# Patient Record
Sex: MALE | Race: WHITE | NOT HISPANIC OR LATINO | Employment: FULL TIME | URBAN - NONMETROPOLITAN AREA
[De-identification: names, ages, dates, MRNs, and addresses within clinical notes are randomized per-mention and may not be internally consistent; named-entity substitution may affect disease eponyms.]

---

## 2017-01-12 DIAGNOSIS — R05.9 COUGH: ICD-10-CM

## 2017-01-12 RX ORDER — ALBUTEROL SULFATE 90 UG/1
2 AEROSOL, METERED RESPIRATORY (INHALATION) EVERY 6 HOURS PRN
Qty: 8.5 G | Refills: 0 | Status: SHIPPED | OUTPATIENT
Start: 2017-01-12 | End: 2019-06-21

## 2017-02-14 RX ORDER — LEVOTHYROXINE SODIUM 88 UG/1
88 TABLET ORAL EVERY MORNING
Qty: 90 TAB | Refills: 3 | Status: SHIPPED | OUTPATIENT
Start: 2017-02-14 | End: 2017-11-10 | Stop reason: SDUPTHER

## 2017-06-02 ENCOUNTER — TELEPHONE (OUTPATIENT)
Dept: MEDICAL GROUP | Facility: PHYSICIAN GROUP | Age: 57
End: 2017-06-02

## 2017-06-02 NOTE — TELEPHONE ENCOUNTER
1. Caller Name: Devon                                          Call Back Number: [phone] (home)       Patient approves a detailed voicemail message: no    2. SPECIFIC Action To Be Taken: Labs    3. Diagnosis/Clinical Reason for Request: Annual    4. Specialty & Provider Name/Lab/Imaging Location: [facility]    5. Is appointment scheduled for requested order/referral: yes - Appt with Joanna 8/2017    Patient informed they will receive a return phone call from the office ONLY if there are any questions before processing their request. Advised to call back if they haven't received a call from the referral department in 5 days.

## 2017-08-04 ENCOUNTER — OFFICE VISIT (OUTPATIENT)
Dept: MEDICAL GROUP | Facility: PHYSICIAN GROUP | Age: 57
End: 2017-08-04
Payer: OTHER GOVERNMENT

## 2017-08-04 VITALS
TEMPERATURE: 97.6 F | SYSTOLIC BLOOD PRESSURE: 118 MMHG | BODY MASS INDEX: 29.26 KG/M2 | HEART RATE: 72 BPM | RESPIRATION RATE: 16 BRPM | WEIGHT: 228 LBS | HEIGHT: 74 IN | OXYGEN SATURATION: 98 % | DIASTOLIC BLOOD PRESSURE: 76 MMHG

## 2017-08-04 DIAGNOSIS — H93.13 TINNITUS OF BOTH EARS: ICD-10-CM

## 2017-08-04 DIAGNOSIS — E55.9 VITAMIN D DEFICIENCY DISEASE: ICD-10-CM

## 2017-08-04 DIAGNOSIS — E03.9 HYPOTHYROIDISM, UNSPECIFIED TYPE: ICD-10-CM

## 2017-08-04 DIAGNOSIS — G89.29 CHRONIC MIDLINE LOW BACK PAIN, WITH SCIATICA PRESENCE UNSPECIFIED: ICD-10-CM

## 2017-08-04 DIAGNOSIS — E78.5 DYSLIPIDEMIA: ICD-10-CM

## 2017-08-04 DIAGNOSIS — M54.5 CHRONIC MIDLINE LOW BACK PAIN, WITH SCIATICA PRESENCE UNSPECIFIED: ICD-10-CM

## 2017-08-04 PROCEDURE — 99214 OFFICE O/P EST MOD 30 MIN: CPT | Performed by: NURSE PRACTITIONER

## 2017-08-04 RX ORDER — CYCLOBENZAPRINE HCL 10 MG
5-10 TABLET ORAL 3 TIMES DAILY PRN
Qty: 30 TAB | Refills: 1 | Status: SHIPPED | OUTPATIENT
Start: 2017-08-04 | End: 2018-10-01 | Stop reason: SDUPTHER

## 2017-08-04 RX ORDER — BIOTIN 5 MG
TABLET ORAL
COMMUNITY
End: 2022-02-02

## 2017-08-04 ASSESSMENT — PATIENT HEALTH QUESTIONNAIRE - PHQ9
SUM OF ALL RESPONSES TO PHQ QUESTIONS 1-9: 3
CLINICAL INTERPRETATION OF PHQ2 SCORE: 1
5. POOR APPETITE OR OVEREATING: 0 - NOT AT ALL

## 2017-08-04 NOTE — PROGRESS NOTES
"Chief Complaint   Patient presents with   • Annual Exam     pt states, \"feeling run down\"         This is a 56 y.o.male patient that presents today with the following: Follow-up visit, establish care with new PCP discuss acute and chronic condition    Hypothyroidism  This is a chronic condition, status of control unknown. He is due for labs, these were ordered. Takes levothyroxine 88 mcg daily. He does feel tired and run down lately.  I will notify patient of results and further actions as needed.    Dyslipidemia  This is a chronic condition, status of control unknown. He is due for labs, these have been ordered. In the interim he is to continue with healthy eating, regular physical activity and continued efforts towards weight loss.    Vitamin D deficiency disease  Patient does have history of vitamin D deficiency, he takes over-the-counter vitamin D supplements. He has not had a vitamin D level checked, labs and ordered. I will notify him of results and further actions if needed.    Chronic back pain  Patient does have long-standing history of chronic back pain for which he mostly takes over-the-counter Tylenol or ibuprofen. He will occasionally take Flexeril at the end of the day but tries to avoid taking this too frequently because of side effects. However he is running low on his prescription and is requesting refill. This was called in for him. I did discuss with him the risks, benefits and side effects of medication.    Tinnitus of both ears  Patient does complain of chronic tinnitus of both ears, left greater than right. 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 He is wanting to know if this is anything else that can be done for it other than using white noise for distraction. He tells me his wife knows of someone it takes a medication to treat it. I did discuss with him that is likely an antidepressant because the tinnitus is extremely bothersome. Patient states " that that is likely what it is and he is not interested in an antidepressant, the tinnitus is not significantly bothersome to him at this point. He will let me know however if it does become bothersome to him.      No visits with results within 1 Month(s) from this visit.  Latest known visit with results is:    Office Visit on 03/07/2016   Component Date Value   • POC Color 03/07/2016 yellow    • POC Appearance 03/07/2016 clear    • POC Leukocyte Esterase 03/07/2016 neg    • POC Nitrites 03/07/2016 neg    • POC Urobiligen 03/07/2016 neg    • POC Protein 03/07/2016 neg    • POC Urine PH 03/07/2016 5.0    • POC Blood 03/07/2016 neg    • POC Specific Gravity 03/07/2016 1.005    • POC Ketones 03/07/2016 neg    • POC Biliruben 03/07/2016 neg    • POC Glucose 03/07/2016 neg          clinical course has been stable    Past Medical History   Diagnosis Date   • Hypothyroidism 8/25/2010   • Backpain      2000 started   • ED (erectile dysfunction) 8/25/2010   • Dyslipidemia 11/8/2011   • Vitamin d deficiency 5/2/2012       Past Surgical History   Procedure Laterality Date   • Laminotomy     • Other orthopedic surgery       back surgery 2002 L4   • Lumbar laminectomy diskectomy  3/18/2011     Performed by RAJ VALADEZ at SURGERY [facility]   • Lumbar decompression  3/18/2011     Performed by RAJ VALADEZ at SURGERY [facility]       Family History   Problem Relation Age of Onset   • Psychiatry Mother    • Diabetes Father        Review of patient's allergies indicates no known allergies.    Current Outpatient Prescriptions Ordered in [facility]   Medication Sig Dispense Refill   • Krill Oil 1000 MG Cap Take  by mouth.     • Probiotic Product (PROBIOTIC DAILY PO) Take  by mouth.     • cyclobenzaprine (FLEXERIL) 10 MG Tab Take 0.5-1 Tabs by mouth 3 times a day as needed for Moderate Pain. 30 Tab 1   • levothyroxine (SYNTHROID) 88 MCG Tab Take 1 Tab by mouth every morning. 90 Tab 3   • albuterol 108 (90 BASE) MCG/ACT Aero  "Soln inhalation aerosol Inhale 2 Puffs by mouth every 6 hours as needed for Shortness of Breath. 8.5 g 0   • naproxen (NAPROSYN) 500 MG Tab Take 1 Tab by mouth 2 times a day, with meals. 180 Tab 1   • Cholecalciferol (VITAMIN D PO) Take 2,000 Units by mouth every day.     • therapeutic multivitamin-minerals (THERAGRAN-M) TABS Take 1 Tab by mouth every day.         No current Epic-ordered facility-administered medications on file.       Constitutional ROS: No unexpected change in weight, No weakness, No unexplained fevers, sweats, or chills  Pulmonary ROS: No chronic cough, sputum, or hemoptysis, No shortness of breath, No recent change in breathing  Cardiovascular ROS: No chest pain, No edema, No palpitations  Gastrointestinal ROS: No abdominal pain, No nausea, vomiting, diarrhea, or constipation, no blood in stool  Musculoskeletal/Extremities ROS: Positive for chronic back pain  Neurologic ROS: Normal development, No seizures, No weakness  Endocrine ROS: Positive per history of present illness    Physical exam:  /76 mmHg  Pulse 72  Temp(Src) 36.4 °C (97.6 °F)  Resp 16  Ht 1.867 m (6' 1.5\")  Wt 103.42 kg (228 lb)  BMI 29.67 kg/m2  SpO2 98%  General Appearance: Middle-aged male, alert, no distress, moderately overweight, well-groomed  Skin: Skin color, texture, turgor normal. No rashes or lesions.  Lungs: negative findings: normal respiratory rate and rhythm, lungs clear to auscultation  Heart: negative. RRR without murmur, gallop, or rubs.  No ectopy.  Abdomen: Abdomen soft, non-tender. BS normal. No masses,  No organomegaly  Musculoskeletal: negative findings: ROM of all joints is normal, no evidence of joint instability, strength normal, no deformities present  Neurologic: intact, oriented, mood appropriate, judgment intact. Cranial nerves II-12 grossly intact    Medical decision making/discussion: Patient here to establish care with a PCP and discuss his acute and chronic conditions. He is due for " labs, these have been ordered. I will notify him of results and further actions if needed. He is to continue on all of his medications as prescribed and call for refills as needed. Flexeril has been refilled his take this as prescribed. He is to continue with healthy eating, regular physical activity and continued efforts towards weight loss. Otherwise he is to follow up annually and as needed    Devon was seen today for annual exam.    Diagnoses and all orders for this visit:    Chronic midline low back pain, with sciatica presence unspecified  -     cyclobenzaprine (FLEXERIL) 10 MG Tab; Take 0.5-1 Tabs by mouth 3 times a day as needed for Moderate Pain.    Hypothyroidism, unspecified type  -     TSH WITH REFLEX TO FT4; Future    Dyslipidemia  -     COMP METABOLIC PANEL; Future  -     LIPID PROFILE; Future    Vitamin D deficiency disease  -     VITAMIN D,25 HYDROXY; Future    Tinnitus of both ears          Please note that this dictation was created using voice recognition software. I have made every reasonable attempt to correct obvious errors, but I expect that there are errors of grammar and possibly content that I did not discover before finalizing the note.

## 2017-08-04 NOTE — ASSESSMENT & PLAN NOTE
Patient does have history of vitamin D deficiency, he takes over-the-counter vitamin D supplements. He has not had a vitamin D level checked, labs and ordered. I will notify him of results and further actions if needed.

## 2017-08-04 NOTE — ASSESSMENT & PLAN NOTE
Patient does complain of chronic tinnitus of both ears, left greater than right. 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 He is wanting to know if this is anything else that can be done for it other than using white noise for distraction. He tells me his wife knows of someone it takes a medication to treat it. I did discuss with him that is likely an antidepressant because the tinnitus is extremely bothersome. Patient states that that is likely what it is and he is not interested in an antidepressant, the tinnitus is not significantly bothersome to him at this point. He will let me know however if it does become bothersome to him.

## 2017-08-04 NOTE — ASSESSMENT & PLAN NOTE
This is a chronic condition, status of control unknown. He is due for labs, these were ordered. Takes levothyroxine 88 mcg daily. He does feel tired and run down lately.  I will notify patient of results and further actions as needed.

## 2017-08-04 NOTE — ASSESSMENT & PLAN NOTE
Patient does have long-standing history of chronic back pain for which he mostly takes over-the-counter Tylenol or ibuprofen. He will occasionally take Flexeril at the end of the day but tries to avoid taking this too frequently because of side effects. However he is running low on his prescription and is requesting refill. This was called in for him. I did discuss with him the risks, benefits and side effects of medication.

## 2017-08-04 NOTE — PATIENT INSTRUCTIONS
Have your labs done in the next week or so, will notify you of results    Take meds as prescribed--    Flu shot in the fall    Follow up with me annually and as needed

## 2017-08-04 NOTE — MR AVS SNAPSHOT
"        Devon Aman   2017 9:20 AM   Office Visit   MRN: [id number]    Department:  [facility]   Dept Phone:  [phone]    Description:  Male : 1960   Provider:  NIA Castro           Reason for Visit     Annual Exam pt states, \"feeling run down\"      Allergies as of 2017     No Known Allergies      You were diagnosed with     Chronic midline low back pain, with sciatica presence unspecified   [6333607]       Hypothyroidism, unspecified type   [6211446]       Dyslipidemia   [207835]       Vitamin D deficiency disease   [274714]       Tinnitus of both ears   [782477]         Vital Signs     Blood Pressure Pulse Temperature Respirations Height Weight    118/76 mmHg 72 36.4 °C (97.6 °F) 16 1.867 m (6' 1.5\") 103.42 kg (228 lb)    Body Mass Index Oxygen Saturation                29.67 kg/m2 98%          Basic Information     Date Of Birth Sex Race Ethnicity Preferred Language    1960 Male White Non- English      Problem List              ICD-10-CM Priority Class Noted - Resolved    Hypothyroidism E03.9   2010 - Present    ED (erectile dysfunction) N52.9   2010 - Present    Dyslipidemia E78.5   2011 - Present    Right shoulder pain M25.511   2014 - Present    Vitamin D deficiency disease E55.9   2014 - Present    Chronic back pain M54.9, G89.29   2015 - Present    Memory changes R41.3   2015 - Present    Cervical radiculopathy M54.12   3/18/2016 - Present    Lumbar stenosis M48.06   3/18/2016 - Present    Tinnitus of both ears H93.13   2017 - Present      Health Maintenance        Date Due Completion Dates    IMM INFLUENZA (1) 2017 ---    IMM DTaP/Tdap/Td Vaccine (2 - Td) 2022    COLONOSCOPY 2023            Current Immunizations     Tdap Vaccine 2012  3:49 PM      Below and/or attached are the medications your provider expects you to take. Review all of your home medications and newly " ordered medications with your provider and/or pharmacist. Follow medication instructions as directed by your provider and/or pharmacist. Please keep your medication list with you and share with your provider. Update the information when medications are discontinued, doses are changed, or new medications (including over-the-counter products) are added; and carry medication information at all times in the event of emergency situations     Allergies:  No Known Allergies          Medications  Valid as of: August 04, 2017 -  9:43 AM    Generic Name Brand Name Tablet Size Instructions for use    Albuterol Sulfate (Aero Soln) albuterol 108 (90 BASE) MCG/ACT Inhale 2 Puffs by mouth every 6 hours as needed for Shortness of Breath.        Cholecalciferol   Take 2,000 Units by mouth every day.        Cyclobenzaprine HCl (Tab) FLEXERIL 10 MG Take 0.5-1 Tabs by mouth 3 times a day as needed for Moderate Pain.        Krill Oil (Cap) Krill Oil 1000 MG Take  by mouth.        Levothyroxine Sodium (Tab) SYNTHROID 88 MCG Take 1 Tab by mouth every morning.        Multiple Vitamins-Minerals (Tab) THERAGRAN-M  Take 1 Tab by mouth every day.          Naproxen (Tab) NAPROSYN 500 MG Take 1 Tab by mouth 2 times a day, with meals.        Probiotic Product   Take  by mouth.        .                 Medicines prescribed today were sent to:     [id number] [place] [facility] [address]    [address] [address]    Phone: [phone] Fax: [phone]    Open 24 Hours?: No    [id number] [place] [facility] [address] AT [facility] [address]    [address] [address]    Phone: [phone] Fax: [phone]    Open 24 Hours?: No    [facility] - [place], [place] - [address] [facility]    [address] [facility] [address]    Phone: [phone] Fax: [phone]    Open 24 Hours?: No      Medication refill instructions:       If your  prescription bottle indicates you have medication refills left, it is not necessary to call your provider’s office. Please contact your pharmacy and they will refill your medication.    If your prescription bottle indicates you do not have any refills left, you may request refills at any time through one of the following ways: The online [facility] system (except Urgent Care), by calling your provider’s office, or by asking your pharmacy to contact your provider’s office with a refill request. Medication refills are processed only during regular business hours and may not be available until the next business day. Your provider may request additional information or to have a follow-up visit with you prior to refilling your medication.   *Please Note: Medication refills are assigned a new Rx number when refilled electronically. Your pharmacy may indicate that no refills were authorized even though a new prescription for the same medication is available at the pharmacy. Please request the medicine by name with the pharmacy before contacting your provider for a refill.        Your To Do List     Future Labs/Procedures Complete By Expires    COMP METABOLIC PANEL  As directed 8/4/2018    LIPID PROFILE  As directed 8/4/2018    TSH WITH REFLEX TO FT4  As directed 8/4/2018    VITAMIN D,25 HYDROXY  As directed 8/4/2018      Instructions    Have your labs done in the next week or so, will notify you of results    Take meds as prescribed--    Flu shot in the fall    Follow up with me annually and as needed          [facility] Access Code: Activation code not generated  Current [facility] Status: Active

## 2017-08-04 NOTE — ASSESSMENT & PLAN NOTE
This is a chronic condition, status of control unknown. He is due for labs, these have been ordered. In the interim he is to continue with healthy eating, regular physical activity and continued efforts towards weight loss.

## 2017-09-01 ENCOUNTER — OFFICE VISIT (OUTPATIENT)
Dept: URGENT CARE | Facility: PHYSICIAN GROUP | Age: 57
End: 2017-09-01
Payer: OTHER GOVERNMENT

## 2017-09-01 VITALS
BODY MASS INDEX: 32.6 KG/M2 | RESPIRATION RATE: 12 BRPM | HEART RATE: 62 BPM | WEIGHT: 246 LBS | DIASTOLIC BLOOD PRESSURE: 62 MMHG | SYSTOLIC BLOOD PRESSURE: 110 MMHG | TEMPERATURE: 97.5 F | OXYGEN SATURATION: 96 % | HEIGHT: 73 IN

## 2017-09-01 DIAGNOSIS — L02.32 BOIL OF BUTTOCK: ICD-10-CM

## 2017-09-01 DIAGNOSIS — E66.9 OBESITY (BMI 30-39.9): ICD-10-CM

## 2017-09-01 PROCEDURE — 99214 OFFICE O/P EST MOD 30 MIN: CPT | Performed by: FAMILY MEDICINE

## 2017-09-01 RX ORDER — SULFAMETHOXAZOLE AND TRIMETHOPRIM 800; 160 MG/1; MG/1
TABLET ORAL
Qty: 20 TAB | Refills: 0 | Status: SHIPPED | OUTPATIENT
Start: 2017-09-01 | End: 2019-06-21

## 2017-09-01 NOTE — PROGRESS NOTES
Chief Complaint:    Chief Complaint   Patient presents with   • Cyst     Lt Side buttock, inflammation       History of Present Illness:    This is a new problem. For few days, has left-sided buttock redness, swelling, and pain. Has had 3 prior similar episodes, twice needing I&D, once not needing I&D.      Review of Systems:    Constitutional: Negative for fever, chills, and diaphoresis.   Eyes: Negative for change in vision, photophobia, pain, redness, and discharge.  ENT: Negative for ear pain, ear discharge, hearing loss, tinnitus, nasal congestion, nosebleeds, and sore throat.    Respiratory: Negative for cough, hemoptysis, sputum production, shortness of breath, wheezing, and stridor.    Cardiovascular: Negative for chest pain, palpitations, orthopnea, claudication, leg swelling, and PND.   Gastrointestinal: Negative for abdominal pain, nausea, vomiting, diarrhea, constipation, blood in stool, and melena.   Genitourinary: Negative for dysuria, urinary urgency, urinary frequency, hematuria, and flank pain.   Musculoskeletal: No new symptoms.   Skin: See HPI.   Neurological: Negative for dizziness, tingling, tremors, sensory change, speech change, focal weakness, seizures, loss of consciousness, and headaches.   Endo: Hypothyroid, on medication.  Heme: Does not bruise/bleed easily.   Psychiatric/Behavioral: Negative for depression, suicidal ideas, hallucinations, memory loss and substance abuse. The patient is not nervous/anxious and does not have insomnia.      Past Medical History:    Past Medical History:   Diagnosis Date   • Vitamin d deficiency 5/2/2012   • Dyslipidemia 11/8/2011   • Hypothyroidism 8/25/2010   • ED (erectile dysfunction) 8/25/2010   • Backpain     2000 started       Past Surgical History:    Past Surgical History:   Procedure Laterality Date   • LUMBAR LAMINECTOMY DISKECTOMY  3/18/2011    Performed by RAJ VALADEZ at SURGERY [facility]   • LUMBAR DECOMPRESSION  3/18/2011    Performed  "by RAJ VALADEZ at SURGERY [facility]   • LAMINOTOMY     • OTHER ORTHOPEDIC SURGERY      back surgery 2002 L4       Social History:    Social History     Social History   • Marital status:      Spouse name: N/A   • Number of children: N/A   • Years of education: N/A     Occupational History   • Not on file.     Social History Main Topics   • Smoking status: Light Tobacco Smoker     Types: Cigars   • Smokeless tobacco: Former User     Types: Snuff, Chew     Quit date: 1/1/2017   • Alcohol use 2.5 oz/week     5 Cans of beer per week   • Drug use: No   • Sexual activity: Yes     Partners: Female     Other Topics Concern   • Not on file     Social History Narrative   • No narrative on file       Family History:    Family History   Problem Relation Age of Onset   • Psychiatry Mother    • Diabetes Father        Medications:    Current Outpatient Prescriptions on File Prior to Visit   Medication Sig Dispense Refill   • Krill Oil 1000 MG Cap Take  by mouth.     • Probiotic Product (PROBIOTIC DAILY PO) Take  by mouth.     • levothyroxine (SYNTHROID) 88 MCG Tab Take 1 Tab by mouth every morning. 90 Tab 3   • Cholecalciferol (VITAMIN D PO) Take 2,000 Units by mouth every day.     • therapeutic multivitamin-minerals (THERAGRAN-M) TABS Take 1 Tab by mouth every day.       • cyclobenzaprine (FLEXERIL) 10 MG Tab Take 0.5-1 Tabs by mouth 3 times a day as needed for Moderate Pain. 30 Tab 1   • albuterol 108 (90 BASE) MCG/ACT Aero Soln inhalation aerosol Inhale 2 Puffs by mouth every 6 hours as needed for Shortness of Breath. 8.5 g 0   • naproxen (NAPROSYN) 500 MG Tab Take 1 Tab by mouth 2 times a day, with meals. 180 Tab 1     No current facility-administered medications on file prior to visit.        Allergies:    No Known Allergies      Vitals:    Vitals:    09/01/17 1222   BP: 110/62   Pulse: 62   Resp: 12   Temp: 36.4 °C (97.5 °F)   SpO2: 96%   Weight: 111.6 kg (246 lb)   Height: 1.854 m (6' 1\")       Physical " Exam:    Constitutional: Vital signs reviewed. Appears well-developed and well-nourished. No acute distress.   Eyes: Sclera white, conjunctivae clear.   ENT: External ears normal. Hearing normal.   Neck: Neck supple.   Pulmonary/Chest: Respirations non-labored.   Musculoskeletal: Normal gait. Normal range of motion. No muscular atrophy or weakness.  Neurological: Alert and oriented to person, place, and time. Muscle tone normal. Coordination normal.   Skin: Left mid buttock, near cleft region: skin is erythematous, indurated, and tender to palpation. I do not see any pus or feel any definite fluctuance.  Psychiatric: Normal mood and affect. Behavior is normal. Judgment and thought content normal.       Assessment / Plan:    1. Boil of buttock  - sulfamethoxazole-trimethoprim (BACTRIM DS) 800-160 MG tablet; 1 TAB TWICE A DAY X 10 DAYS.  Dispense: 20 Tab; Refill: 0    2. Obesity (BMI 30-39.9)  - Patient identified as having weight management issue.  Appropriate orders and counseling given.      Discussed with him DDX and management options.    Discussed with him equivocal for I&D being helpful today but I will attempt if he would like.    Offered him I&D + antibiotic treatment vs. antibiotic treatment only today and return for I&D if getting worse or no improvement at all in 2-3 days.    He prefers to try antibiotic first and return if getting worse or not improving at all in 2-3 days for likely I&D if this is the case.    Agreeable to medication prescribed.    Follow-up with PCP or urgent care if getting worse or not better with above.

## 2017-10-17 RX ORDER — LEVOTHYROXINE SODIUM 88 UG/1
88 TABLET ORAL EVERY MORNING
Qty: 90 TAB | Refills: 0 | OUTPATIENT
Start: 2017-10-17

## 2017-11-08 ENCOUNTER — HOSPITAL ENCOUNTER (OUTPATIENT)
Dept: LAB | Facility: MEDICAL CENTER | Age: 57
End: 2017-11-08
Attending: NURSE PRACTITIONER
Payer: OTHER GOVERNMENT

## 2017-11-08 DIAGNOSIS — E78.5 DYSLIPIDEMIA: ICD-10-CM

## 2017-11-08 DIAGNOSIS — E55.9 VITAMIN D DEFICIENCY DISEASE: ICD-10-CM

## 2017-11-08 DIAGNOSIS — E03.9 HYPOTHYROIDISM, UNSPECIFIED TYPE: ICD-10-CM

## 2017-11-08 LAB
25(OH)D3 SERPL-MCNC: 25 NG/ML (ref 30–100)
ALBUMIN SERPL BCP-MCNC: 4.1 G/DL (ref 3.2–4.9)
ALBUMIN/GLOB SERPL: 1.7 G/DL
ALP SERPL-CCNC: 58 U/L (ref 30–99)
ALT SERPL-CCNC: 21 U/L (ref 2–50)
ANION GAP SERPL CALC-SCNC: 8 MMOL/L (ref 0–11.9)
AST SERPL-CCNC: 21 U/L (ref 12–45)
BILIRUB SERPL-MCNC: 0.7 MG/DL (ref 0.1–1.5)
BUN SERPL-MCNC: 15 MG/DL (ref 8–22)
CALCIUM SERPL-MCNC: 9.4 MG/DL (ref 8.5–10.5)
CHLORIDE SERPL-SCNC: 105 MMOL/L (ref 96–112)
CHOLEST SERPL-MCNC: 223 MG/DL (ref 100–199)
CO2 SERPL-SCNC: 25 MMOL/L (ref 20–33)
CREAT SERPL-MCNC: 1.01 MG/DL (ref 0.5–1.4)
GFR SERPL CREATININE-BSD FRML MDRD: >60 ML/MIN/1.73 M 2
GLOBULIN SER CALC-MCNC: 2.4 G/DL (ref 1.9–3.5)
GLUCOSE SERPL-MCNC: 84 MG/DL (ref 65–99)
HDLC SERPL-MCNC: 52 MG/DL
LDLC SERPL CALC-MCNC: 138 MG/DL
POTASSIUM SERPL-SCNC: 4.3 MMOL/L (ref 3.6–5.5)
PROT SERPL-MCNC: 6.5 G/DL (ref 6–8.2)
SODIUM SERPL-SCNC: 138 MMOL/L (ref 135–145)
T4 FREE SERPL-MCNC: 0.86 NG/DL (ref 0.53–1.43)
TRIGL SERPL-MCNC: 166 MG/DL (ref 0–149)
TSH SERPL DL<=0.005 MIU/L-ACNC: 5.71 UIU/ML (ref 0.3–3.7)

## 2017-11-08 PROCEDURE — 80053 COMPREHEN METABOLIC PANEL: CPT

## 2017-11-08 PROCEDURE — 36415 COLL VENOUS BLD VENIPUNCTURE: CPT

## 2017-11-08 PROCEDURE — 82306 VITAMIN D 25 HYDROXY: CPT

## 2017-11-08 PROCEDURE — 84443 ASSAY THYROID STIM HORMONE: CPT

## 2017-11-08 PROCEDURE — 84439 ASSAY OF FREE THYROXINE: CPT

## 2017-11-08 PROCEDURE — 80061 LIPID PANEL: CPT

## 2017-11-10 DIAGNOSIS — E03.9 HYPOTHYROIDISM, UNSPECIFIED TYPE: ICD-10-CM

## 2017-11-10 NOTE — TELEPHONE ENCOUNTER
*CHANGE IN PHARMACY*  Was the patient seen in the last year in this department? Yes     Does patient have an active prescription for medications requested? Yes     Received Request Via: Pharmacy      Pt met protocol?: NO    LAST OV 08/04/2017    No results found for: TSH      Lab Results  Component Value Date/Time   TSHULTRASEN 5.710 (H) 11/08/2017 0714

## 2017-11-13 RX ORDER — LEVOTHYROXINE SODIUM 0.1 MG/1
100 TABLET ORAL EVERY MORNING
Qty: 90 TAB | Refills: 1 | Status: SHIPPED | OUTPATIENT
Start: 2017-11-13 | End: 2018-07-02 | Stop reason: SDUPTHER

## 2017-11-13 NOTE — TELEPHONE ENCOUNTER
I have filled this but I increased the dose to 100 mcg as his TSH is mildly elevated. I would like him to have a TSH done in about 2 months, this has been ordered.

## 2018-07-02 NOTE — TELEPHONE ENCOUNTER
----- Message from Devon Newton sent at 6/30/2018  8:27 AM PDT -----  Regarding: Prescription Question  Contact: [phone]  Morning,   I need a refill for Synthroid 100 mcg for 90 days.  Can you please send it to [facility] in [place]?  You can call me at [phone] for any information.    Thanks  Devon

## 2018-07-03 RX ORDER — LEVOTHYROXINE SODIUM 0.1 MG/1
100 TABLET ORAL EVERY MORNING
Qty: 90 TAB | Refills: 0 | Status: SHIPPED | OUTPATIENT
Start: 2018-07-03 | End: 2018-10-01 | Stop reason: SDUPTHER

## 2018-07-03 NOTE — TELEPHONE ENCOUNTER
Patient notified. Appointment scheduled and would like PCP to order labs before his next appointment which is 10/1/2018.

## 2018-10-01 ENCOUNTER — OFFICE VISIT (OUTPATIENT)
Dept: MEDICAL GROUP | Facility: PHYSICIAN GROUP | Age: 58
End: 2018-10-01
Payer: OTHER GOVERNMENT

## 2018-10-01 VITALS
BODY MASS INDEX: 33.93 KG/M2 | OXYGEN SATURATION: 94 % | SYSTOLIC BLOOD PRESSURE: 118 MMHG | WEIGHT: 256 LBS | HEART RATE: 84 BPM | DIASTOLIC BLOOD PRESSURE: 72 MMHG | HEIGHT: 73 IN | RESPIRATION RATE: 16 BRPM | TEMPERATURE: 98.3 F

## 2018-10-01 DIAGNOSIS — E78.5 DYSLIPIDEMIA: ICD-10-CM

## 2018-10-01 DIAGNOSIS — Z23 NEED FOR INFLUENZA VACCINATION: ICD-10-CM

## 2018-10-01 DIAGNOSIS — G89.29 CHRONIC RIGHT SHOULDER PAIN: ICD-10-CM

## 2018-10-01 DIAGNOSIS — E55.9 VITAMIN D DEFICIENCY DISEASE: ICD-10-CM

## 2018-10-01 DIAGNOSIS — N52.9 ERECTILE DYSFUNCTION, UNSPECIFIED ERECTILE DYSFUNCTION TYPE: ICD-10-CM

## 2018-10-01 DIAGNOSIS — M54.5 CHRONIC MIDLINE LOW BACK PAIN, WITH SCIATICA PRESENCE UNSPECIFIED: ICD-10-CM

## 2018-10-01 DIAGNOSIS — E03.9 HYPOTHYROIDISM, UNSPECIFIED TYPE: ICD-10-CM

## 2018-10-01 DIAGNOSIS — G89.29 CHRONIC MIDLINE LOW BACK PAIN, WITH SCIATICA PRESENCE UNSPECIFIED: ICD-10-CM

## 2018-10-01 DIAGNOSIS — E66.9 OBESITY (BMI 30-39.9): ICD-10-CM

## 2018-10-01 DIAGNOSIS — M25.511 CHRONIC RIGHT SHOULDER PAIN: ICD-10-CM

## 2018-10-01 PROCEDURE — 99214 OFFICE O/P EST MOD 30 MIN: CPT | Mod: 25 | Performed by: NURSE PRACTITIONER

## 2018-10-01 PROCEDURE — 90471 IMMUNIZATION ADMIN: CPT | Performed by: NURSE PRACTITIONER

## 2018-10-01 PROCEDURE — 90686 IIV4 VACC NO PRSV 0.5 ML IM: CPT | Performed by: NURSE PRACTITIONER

## 2018-10-01 RX ORDER — SILDENAFIL 50 MG/1
50 TABLET, FILM COATED ORAL PRN
Qty: 10 TAB | Refills: 3 | Status: SHIPPED | OUTPATIENT
Start: 2018-10-01 | End: 2019-06-21

## 2018-10-01 RX ORDER — CYCLOBENZAPRINE HCL 10 MG
5-10 TABLET ORAL 3 TIMES DAILY PRN
Qty: 30 TAB | Refills: 0 | Status: SHIPPED | OUTPATIENT
Start: 2018-10-01 | End: 2021-04-14 | Stop reason: SDUPTHER

## 2018-10-01 RX ORDER — LEVOTHYROXINE SODIUM 0.1 MG/1
100 TABLET ORAL EVERY MORNING
Qty: 90 TAB | Refills: 3 | Status: SHIPPED | OUTPATIENT
Start: 2018-10-01 | End: 2019-11-05 | Stop reason: SDUPTHER

## 2018-10-01 ASSESSMENT — PATIENT HEALTH QUESTIONNAIRE - PHQ9: CLINICAL INTERPRETATION OF PHQ2 SCORE: 0

## 2018-10-01 NOTE — ASSESSMENT & PLAN NOTE
Pt has history of ED for which he has taken Viagra in the past that worked well for him. He is requesting refills, this was called in for him. We discussed again the risks, benefits and AEs. He knows what to go to ER for.

## 2018-10-01 NOTE — ASSESSMENT & PLAN NOTE
Pt has chronic R shoulder and low back pain for which he takes flexeril--however he takes this very sparingly, maybe once a month. He does need refills, this was called in for him.

## 2018-10-01 NOTE — ASSESSMENT & PLAN NOTE
This is chronic, status of control unknown as he is past due for labs, this was ordered. He is not on statin.

## 2018-10-01 NOTE — ASSESSMENT & PLAN NOTE
This is a chronic condition, stable and controlled with levothyroxine, 100 mcg daily. He is due for labs, these were ordered. He denies s/sx of hypothyroidism. He needs refills, this was called in for him.

## 2018-10-01 NOTE — PATIENT INSTRUCTIONS
Flu shot today    Labs very soon--will notify you of results    See me annually and as needed    Med refilled   Name band;

## 2018-10-01 NOTE — PROGRESS NOTES
Chief Complaint   Patient presents with   • Annual Exam     med refills         This is a 57 y.o.male patient that presents today with the following: follow up, med refill, labs    Hypothyroidism  This is a chronic condition, stable and controlled with levothyroxine, 100 mcg daily. He is due for labs, these were ordered. He denies s/sx of hypothyroidism. He needs refills, this was called in for him.     ED (erectile dysfunction)  Pt has history of ED for which he has taken Viagra in the past that worked well for him. He is requesting refills, this was called in for him. We discussed again the risks, benefits and AEs. He knows what to go to ER for.    Dyslipidemia  This is chronic, status of control unknown as he is past due for labs, this was ordered. He is not on statin.    Right shoulder pain  Pt has chronic R shoulder and low back pain for which he takes flexeril--however he takes this very sparingly, maybe once a month. He does need refills, this was called in for him.    Vitamin D deficiency disease  This is chronic, status of control unknown as he is past due for labs. This was ordered.    Chronic back pain  See additional notes.    Obesity (BMI 30-39.9)  Pt continues to work on weight loss, trying to eat healthy and exercise regularly.       No visits with results within 1 Month(s) from this visit.   Latest known visit with results is:   Hospital Outpatient Visit on 11/08/2017   Component Date Value   • Sodium 11/08/2017 138    • Potassium 11/08/2017 4.3    • Chloride 11/08/2017 105    • Co2 11/08/2017 25    • Anion Gap 11/08/2017 8.0    • Glucose 11/08/2017 84    • Bun 11/08/2017 15    • Creatinine 11/08/2017 1.01    • Calcium 11/08/2017 9.4    • AST(SGOT) 11/08/2017 21    • ALT(SGPT) 11/08/2017 21    • Alkaline Phosphatase 11/08/2017 58    • Total Bilirubin 11/08/2017 0.7    • Albumin 11/08/2017 4.1    • Total Protein 11/08/2017 6.5    • Globulin 11/08/2017 2.4    • A-G Ratio 11/08/2017 1.7    •  Cholesterol,Tot 11/08/2017 223*   • Triglycerides 11/08/2017 166*   • HDL 11/08/2017 52    • LDL 11/08/2017 138*   • TSH 11/08/2017 5.710*   • 25-Hydroxy   Vitamin D 25 11/08/2017 25*   • Free T-4 11/08/2017 0.86    • GFR If  11/08/2017 >60    • GFR If Non  Ameri* 11/08/2017 >60          clinical course has been stable    Past Medical History:   Diagnosis Date   • Backpain     2000 started   • Dyslipidemia 11/8/2011   • ED (erectile dysfunction) 8/25/2010   • Hypothyroidism 8/25/2010   • Vitamin d deficiency 5/2/2012       Past Surgical History:   Procedure Laterality Date   • LUMBAR LAMINECTOMY DISKECTOMY  3/18/2011    Performed by RAJ VALADEZ at SURGERY [facility] ORS   • LUMBAR DECOMPRESSION  3/18/2011    Performed by RAJ VALADEZ at SURGERY [facility] ORS   • LAMINOTOMY     • OTHER ORTHOPEDIC SURGERY      back surgery 2002 L4       Family History   Problem Relation Age of Onset   • Psychiatry Mother    • Diabetes Father        Patient has no known allergies.    Current Outpatient Prescriptions Ordered in [facility]   Medication Sig Dispense Refill   • levothyroxine (SYNTHROID) 100 MCG Tab Take 1 Tab by mouth every morning. 90 Tab 3   • cyclobenzaprine (FLEXERIL) 10 MG Tab Take 0.5-1 Tabs by mouth 3 times a day as needed for Moderate Pain. 30 Tab 0   • sildenafil citrate (VIAGRA) 50 MG tablet Take 1 Tab by mouth as needed for Erectile Dysfunction. 10 Tab 3   • Krill Oil 1000 MG Cap Take  by mouth.     • Probiotic Product (PROBIOTIC DAILY PO) Take  by mouth.     • Cholecalciferol (VITAMIN D PO) Take 2,000 Units by mouth every day.     • therapeutic multivitamin-minerals (THERAGRAN-M) TABS Take 1 Tab by mouth every day.       • sulfamethoxazole-trimethoprim (BACTRIM DS) 800-160 MG tablet 1 TAB TWICE A DAY X 10 DAYS. 20 Tab 0   • albuterol 108 (90 BASE) MCG/ACT Aero Soln inhalation aerosol Inhale 2 Puffs by mouth every 6 hours as needed for Shortness of Breath. 8.5 g 0   • naproxen (NAPROSYN)  "500 MG Tab Take 1 Tab by mouth 2 times a day, with meals. 180 Tab 1     No current Epic-ordered facility-administered medications on file.        Constitutional ROS: No unexpected change in weight, No weakness, No unexplained fevers, sweats, or chills  Pulmonary ROS: No chronic cough, sputum, or hemoptysis, No shortness of breath, No recent change in breathing  Cardiovascular ROS: No chest pain, No edema, No palpitations. Positive for HTN, hyperlipidemia  Gastrointestinal ROS: No abdominal pain  Musculoskeletal/Extremities ROS: positive per HPI  Neurologic ROS: Normal development, No seizures, No weakness  Endocrine ROS: positive per HPI    Physical exam:  /72 (BP Location: Left arm, Patient Position: Sitting, BP Cuff Size: Adult)   Pulse 84   Temp 36.8 °C (98.3 °F) (Temporal)   Resp 16   Ht 1.854 m (6' 1\")   Wt 116.1 kg (256 lb)   SpO2 94%   BMI 33.78 kg/m²   General Appearance: middle aged male,  alert, no distress, obese, well groomed  Skin: Skin color, texture, turgor normal. No rashes or lesions.  Lungs: negative findings: normal respiratory rate and rhythm, lungs clear to auscultation  Heart: negative. RRR without murmur, gallop, or rubs.  No ectopy.  Abdomen: Abdomen soft, non-tender. BS normal. No masses,  No organomegaly  Musculoskeletal: negative findings: no evidence of joint instability, no evidence of muscle atrophy, no deformities present  Neurologic: intact    Medical decision making/discussion: pt to have labs done in the next couple of days or next week. Medications refilled. He will get a flu shot today. Viagra refilled. He is going to work on healthy diet, exercise and weight loss    Devon was seen today for annual exam.    Diagnoses and all orders for this visit:    Chronic midline low back pain, with sciatica presence unspecified  -     cyclobenzaprine (FLEXERIL) 10 MG Tab; Take 0.5-1 Tabs by mouth 3 times a day as needed for Moderate Pain.    Hypothyroidism, unspecified type  -     " TSH WITH REFLEX TO FT4; Future  -     levothyroxine (SYNTHROID) 100 MCG Tab; Take 1 Tab by mouth every morning.    Chronic right shoulder pain    Dyslipidemia  -     COMP METABOLIC PANEL; Future  -     LIPID PROFILE; Future    Vitamin D deficiency disease  -     VITAMIN D,25 HYDROXY; Future    Erectile dysfunction, unspecified erectile dysfunction type  -     sildenafil citrate (VIAGRA) 50 MG tablet; Take 1 Tab by mouth as needed for Erectile Dysfunction.    Obesity (BMI 30-39.9)  -     Patient identified as having weight management issue.  Appropriate orders and counseling given.    Need for influenza vaccination  -     Influenza Vaccine Quad Injection >3Y (PF)          Please note that this dictation was created using voice recognition software. I have made every reasonable attempt to correct obvious errors, but I expect that there are errors of grammar and possibly content that I did not discover before finalizing the note.      '

## 2018-10-04 ENCOUNTER — TELEPHONE (OUTPATIENT)
Dept: MEDICAL GROUP | Facility: PHYSICIAN GROUP | Age: 58
End: 2018-10-04

## 2018-10-04 NOTE — TELEPHONE ENCOUNTER
PA was received from [facility] for [address].  I have attempted to perform PA however it was rejected with a quantity error.    I have called and spoke with the pharmacist to run quantity #6 which is approved by insurance

## 2019-06-21 ENCOUNTER — OFFICE VISIT (OUTPATIENT)
Dept: MEDICAL GROUP | Facility: PHYSICIAN GROUP | Age: 59
End: 2019-06-21
Payer: OTHER GOVERNMENT

## 2019-06-21 VITALS
BODY MASS INDEX: 31.69 KG/M2 | OXYGEN SATURATION: 96 % | SYSTOLIC BLOOD PRESSURE: 108 MMHG | HEART RATE: 64 BPM | RESPIRATION RATE: 16 BRPM | HEIGHT: 72 IN | WEIGHT: 234 LBS | TEMPERATURE: 97.9 F | DIASTOLIC BLOOD PRESSURE: 74 MMHG

## 2019-06-21 DIAGNOSIS — M25.561 PAIN AND SWELLING OF RIGHT KNEE: ICD-10-CM

## 2019-06-21 DIAGNOSIS — E66.9 OBESITY (BMI 30-39.9): ICD-10-CM

## 2019-06-21 DIAGNOSIS — M25.461 PAIN AND SWELLING OF RIGHT KNEE: ICD-10-CM

## 2019-06-21 DIAGNOSIS — E03.9 HYPOTHYROIDISM, UNSPECIFIED TYPE: ICD-10-CM

## 2019-06-21 DIAGNOSIS — E78.5 DYSLIPIDEMIA: ICD-10-CM

## 2019-06-21 PROCEDURE — 99214 OFFICE O/P EST MOD 30 MIN: CPT | Performed by: FAMILY MEDICINE

## 2019-06-21 ASSESSMENT — PATIENT HEALTH QUESTIONNAIRE - PHQ9: CLINICAL INTERPRETATION OF PHQ2 SCORE: 0

## 2019-06-21 NOTE — ASSESSMENT & PLAN NOTE
Chronic condition last lab in 2017 shows elevated TSH patient continues on levothyroxine 100 mcg daily.  We will recheck a TSH.

## 2019-06-21 NOTE — PROGRESS NOTES
Subjective:   Elissa Mcdaniels is a 58 y.o. male here today for evaluation and management of:     Dyslipidemia  Chronic condition patient continues on Krill oil.  No recent labs available.  Last labs since 2017:  Results for ELISSA MCDANIELS (MRN [id number]) as of 6/21/2019 13:28   Ref. Range 11/8/2017 07:14   Cholesterol,Tot Latest Ref Range: 100 - 199 mg/dL 223 (H)   Triglycerides Latest Ref Range: 0 - 149 mg/dL 166 (H)   HDL Latest Ref Range: >=40 mg/dL 52   LDL Latest Ref Range: <100 mg/dL 138 (H)       Hypothyroidism  Chronic condition last lab in 2017 shows elevated TSH patient continues on levothyroxine 100 mcg daily.  We will recheck a TSH.      Obesity (BMI 30-39.9)  Improving patient has lost weight from 256 pounds in October of last year to 234 pounds this year.  Encouraged him to continue gradual weight loss with healthy diet and exercise.  He is up-to-date on his colonoscopy    Pain and swelling of right knee  June 5 patient woke up with swelling of right knee it has been tight and swollen but not significantly painful.  This has improved but patient still feels tight band across the front of his knee and outside lateral portion of his knee feels unstable he has no recent falls patient recalls before the swelling happened he had been [place] the [place] and at the gym.  He does not recall any trauma that incited this injury/swelling.  Patient has been avoiding excessive walking or hiking.  He did have a walk with his wife last night for a mile and a half his right knee just did not feel right.  Advised him to use ibuprofen 600 mg 3 times a day around-the-clock for 3 to 5 days.  Along with ice pack 20 minutes on 20 minutes off and an Ace wrap.  X-ray ordered today.  If no improvement will need an MRI.  Referral done to orthopedics clinic patient also advised on orthopedic urgent care services.       Patient is encouraged to get his labs done, been ordered by his PCP  Current medicines (including changes  today)  Current Outpatient Prescriptions   Medication Sig Dispense Refill   • levothyroxine (SYNTHROID) 100 MCG Tab Take 1 Tab by mouth every morning. 90 Tab 3   • cyclobenzaprine (FLEXERIL) 10 MG Tab Take 0.5-1 Tabs by mouth 3 times a day as needed for Moderate Pain. 30 Tab 0   • Krill Oil 1000 MG Cap Take  by mouth.     • Probiotic Product (PROBIOTIC DAILY PO) Take  by mouth.     • Cholecalciferol (VITAMIN D PO) Take 2,000 Units by mouth every day.     • therapeutic multivitamin-minerals (THERAGRAN-M) TABS Take 1 Tab by mouth every day.         No current facility-administered medications for this visit.      He  has a past medical history of Backpain; Dyslipidemia (11/8/2011); ED (erectile dysfunction) (8/25/2010); Hypothyroidism (8/25/2010); and Vitamin d deficiency (5/2/2012).    ROS  No chest pain, no shortness of breath, no abdominal pain       Objective:     /74 (BP Location: Left arm, Patient Position: Sitting, BP Cuff Size: Adult)   Pulse 64   Temp 36.6 °C (97.9 °F) (Temporal)   Resp 16   Ht 1.829 m (6')   Wt 106.1 kg (234 lb)   SpO2 96%  Body mass index is 31.74 kg/m².   Physical Exam:  Constitutional: Alert, no distress.  Skin: Warm, dry, good turgor, no rashes in visible areas.  Eye: Equal, round and reactive, conjunctiva clear, lids normal.  ENMT: Lips without lesions, good dentition, oropharynx clear.  Neck: Trachea midline, no masses, no thyromegaly. No cervical or supraclavicular lymphadenopathy  Respiratory: Unlabored respiratory effort, lungs clear to auscultation, no wheezes, no ronchi.  Cardiovascular: Normal S1, S2, no murmur, no edema.  Abdomen: Soft, non-tender, no masses, no hepatosplenomegaly.  Psych: Alert and oriented x3, normal affect and mood.  MSK: Right knee no swelling or effusion, no crepitus with extension and flexion, negative drawer sign negative Sonia sign.  No tenderness to palpation over medial and lateral joint lines.        Assessment and Plan:   The following  treatment plan was discussed    1. Dyslipidemia  Patient encouraged to get labs done.    2. Hypothyroidism, unspecified type  Stable encouraged to get his lab work done.    3. Obesity (BMI 30-39.9)  Patient should continue with regular exercise and healthy diet for gradual weight loss.    4. Pain and swelling of right knee  Continue with ice, NSAIDs, Ace wrap referral to physical therapy first if no improvement will need MRI referral to orthopedics done, patient can follow-up with them if no improvement over the next few weeks.  - DX-KNEE 2- RIGHT; Future  - REFERRAL TO ORTHOPEDICS      Followup: Return in about 3 months (around 9/21/2019), or if symptoms worsen or fail to improve, for Pls print lab order F/V with PCP to review labs hypothyroid, dyslipidemia, right knee swelling.       Please note that this dictation was created using voice recognition software. I have made every reasonable attempt to correct obvious errors, but I expect that there are errors of grammar and possibly content that I did not discover before finalizing the note.

## 2019-06-21 NOTE — ASSESSMENT & PLAN NOTE
Improving patient has lost weight from 256 pounds in October of last year to 234 pounds this year.  Encouraged him to continue gradual weight loss with healthy diet and exercise.  He is up-to-date on his colonoscopy

## 2019-06-21 NOTE — ASSESSMENT & PLAN NOTE
Chronic condition patient continues on Krill oil.  No recent labs available.  Last labs since 2017:  Results for ELISSA MCDANIELS (MRN [id number]) as of 6/21/2019 13:28   Ref. Range 11/8/2017 07:14   Cholesterol,Tot Latest Ref Range: 100 - 199 mg/dL 223 (H)   Triglycerides Latest Ref Range: 0 - 149 mg/dL 166 (H)   HDL Latest Ref Range: >=40 mg/dL 52   LDL Latest Ref Range: <100 mg/dL 138 (H)

## 2019-11-05 DIAGNOSIS — E03.9 HYPOTHYROIDISM, UNSPECIFIED TYPE: ICD-10-CM

## 2019-11-05 DIAGNOSIS — E55.9 VITAMIN D DEFICIENCY DISEASE: ICD-10-CM

## 2019-11-05 DIAGNOSIS — E78.5 DYSLIPIDEMIA: ICD-10-CM

## 2019-11-06 RX ORDER — LEVOTHYROXINE SODIUM 0.1 MG/1
TABLET ORAL
Qty: 90 TAB | Refills: 0 | Status: SHIPPED | OUTPATIENT
Start: 2019-11-06 | End: 2020-06-26 | Stop reason: SDUPTHER

## 2019-11-06 NOTE — TELEPHONE ENCOUNTER
Was the patient seen in the last year in this department? Yes    Does patient have an active prescription for medications requested? No     Received Request Via: Pharmacy      Pt met protocol?: No due for labs   Last ov 6/19   TSH   Date Value Ref Range Status   11/08/2017 5.710 (H) 0.300 - 3.700 uIU/mL Final

## 2020-06-26 DIAGNOSIS — E03.9 HYPOTHYROIDISM, UNSPECIFIED TYPE: ICD-10-CM

## 2020-06-26 NOTE — TELEPHONE ENCOUNTER
Received request via: Pharmacy    Was the patient seen in the last year in this department? No     Does the patient have an active prescription (recently filled or refills available) for medication(s) requested? No       Last ov 6-

## 2020-06-29 RX ORDER — LEVOTHYROXINE SODIUM 0.1 MG/1
TABLET ORAL
Qty: 30 TAB | Refills: 0 | Status: SHIPPED | OUTPATIENT
Start: 2020-06-29 | End: 2021-04-14 | Stop reason: SDUPTHER

## 2020-08-12 DIAGNOSIS — R05.9 COUGH: ICD-10-CM

## 2020-08-12 RX ORDER — BENZONATATE 100 MG/1
100 CAPSULE ORAL 3 TIMES DAILY PRN
Qty: 30 CAP | Refills: 0 | Status: SHIPPED | OUTPATIENT
Start: 2020-08-12 | End: 2021-04-14

## 2021-01-11 ENCOUNTER — APPOINTMENT (OUTPATIENT)
Dept: URGENT CARE | Facility: PHYSICIAN GROUP | Age: 61
End: 2021-01-11
Payer: OTHER GOVERNMENT

## 2021-01-11 ENCOUNTER — APPOINTMENT (OUTPATIENT)
Dept: RADIOLOGY | Facility: IMAGING CENTER | Age: 61
End: 2021-01-11
Attending: NURSE PRACTITIONER
Payer: OTHER GOVERNMENT

## 2021-01-11 DIAGNOSIS — M79.671 RIGHT FOOT PAIN: ICD-10-CM

## 2021-01-11 DIAGNOSIS — M79.672 LEFT FOOT PAIN: ICD-10-CM

## 2021-01-11 PROCEDURE — 73620 X-RAY EXAM OF FOOT: CPT | Mod: TC,FY,LT | Performed by: NURSE PRACTITIONER

## 2021-04-14 ENCOUNTER — OFFICE VISIT (OUTPATIENT)
Dept: MEDICAL GROUP | Facility: PHYSICIAN GROUP | Age: 61
End: 2021-04-14
Payer: OTHER GOVERNMENT

## 2021-04-14 VITALS
HEIGHT: 72 IN | DIASTOLIC BLOOD PRESSURE: 74 MMHG | TEMPERATURE: 98.1 F | SYSTOLIC BLOOD PRESSURE: 120 MMHG | HEART RATE: 71 BPM | RESPIRATION RATE: 16 BRPM | WEIGHT: 272 LBS | OXYGEN SATURATION: 99 % | BODY MASS INDEX: 36.84 KG/M2

## 2021-04-14 DIAGNOSIS — H93.13 TINNITUS OF BOTH EARS: ICD-10-CM

## 2021-04-14 DIAGNOSIS — M54.50 CHRONIC MIDLINE LOW BACK PAIN: ICD-10-CM

## 2021-04-14 DIAGNOSIS — E78.5 DYSLIPIDEMIA: ICD-10-CM

## 2021-04-14 DIAGNOSIS — G89.29 CHRONIC MIDLINE LOW BACK PAIN: ICD-10-CM

## 2021-04-14 DIAGNOSIS — Z13.0 ENCOUNTER FOR SCREENING FOR HEMATOLOGIC DISORDER: ICD-10-CM

## 2021-04-14 DIAGNOSIS — G89.29 CHRONIC MIDLINE LOW BACK PAIN WITH RIGHT-SIDED SCIATICA: ICD-10-CM

## 2021-04-14 DIAGNOSIS — M48.061 SPINAL STENOSIS OF LUMBAR REGION, UNSPECIFIED WHETHER NEUROGENIC CLAUDICATION PRESENT: ICD-10-CM

## 2021-04-14 DIAGNOSIS — M54.41 CHRONIC MIDLINE LOW BACK PAIN WITH RIGHT-SIDED SCIATICA: ICD-10-CM

## 2021-04-14 DIAGNOSIS — N52.9 ERECTILE DYSFUNCTION, UNSPECIFIED ERECTILE DYSFUNCTION TYPE: ICD-10-CM

## 2021-04-14 DIAGNOSIS — E03.9 HYPOTHYROIDISM, UNSPECIFIED TYPE: ICD-10-CM

## 2021-04-14 DIAGNOSIS — E55.9 VITAMIN D DEFICIENCY DISEASE: ICD-10-CM

## 2021-04-14 PROCEDURE — 99214 OFFICE O/P EST MOD 30 MIN: CPT | Performed by: NURSE PRACTITIONER

## 2021-04-14 RX ORDER — SILDENAFIL 50 MG/1
50 TABLET, FILM COATED ORAL PRN
Qty: 20 TABLET | Refills: 3 | Status: SHIPPED | OUTPATIENT
Start: 2021-04-14 | End: 2023-05-24 | Stop reason: SDUPTHER

## 2021-04-14 RX ORDER — LEVOTHYROXINE SODIUM 0.1 MG/1
TABLET ORAL
Qty: 90 TABLET | Refills: 3 | Status: SHIPPED | OUTPATIENT
Start: 2021-04-14 | End: 2022-09-19

## 2021-04-14 RX ORDER — CYCLOBENZAPRINE HCL 10 MG
5-10 TABLET ORAL 3 TIMES DAILY PRN
Qty: 30 TABLET | Refills: 0 | Status: SHIPPED | OUTPATIENT
Start: 2021-04-14 | End: 2022-06-02 | Stop reason: SDUPTHER

## 2021-04-14 ASSESSMENT — PATIENT HEALTH QUESTIONNAIRE - PHQ9: CLINICAL INTERPRETATION OF PHQ2 SCORE: 0

## 2021-04-14 NOTE — PROGRESS NOTES
Chief Complaint   Patient presents with   • Back Pain   • Knee Pain       HISTORY OF PRESENT ILLNESS: Patient is a 60 y.o. male established patient who presents today to multiple concerns, needs labs, referral    Lumbar stenosis  Patient has chronic low back pain associated with lumbar stenosis, previously followed by spine specialist, has not been seen by specialist in over 5 years, he has undergone 2 surgeries  He has associated sciatica especially on the right lower extremity  Reports the pain is starting to increase and become very bothersome  Currently takes cyclobenzaprine as needed and does need a refill  He leaves it is time to be seen again by specialist discuss other treatment options, referral placed denies red flag warning symptoms  He also reports to me he has been doing physical therapy exercises at home but does decline a new referral      Tinnitus of both ears  Patient does struggle with tinnitus and has impaired hearing  We talked about multiple treatment options including SSRIs and a referral to ENT  He does agree to referral to ENT for further evaluation      Hypothyroidism  This is chronic and stable, status of control unknown has he has not had labs done in well over a year takes levothyroxine 100 mcg daily  Denies symptoms associated with this      ED (erectile dysfunction)  Patient does struggle with erectile dysfunction for which he uses sildenafil as needed  Has difficulty initiating and maintaining erection  Reports decreased to nonexistent morning erections  Discussed various causes  He does understand if symptoms do not improve with Viagra we will need to consider referral to urology    Dyslipidemia  The ASCVD Risk score ([place] AKOSUA Jr, et al., 2013) failed to calculate.  Patient not currently on statin but past due for labs  Discussed the importance of ongoing healthy lifestyle modifications      Patient Active Problem List    Diagnosis Date Noted   • Pain and swelling of right knee  06/21/2019   • Obesity (BMI 30-39.9) 09/01/2017   • Tinnitus of both ears 08/04/2017   • Cervical radiculopathy 03/18/2016   • Lumbar stenosis 03/18/2016   • Memory changes 05/13/2015   • Chronic back pain 05/11/2015   • Right shoulder pain 04/16/2014   • Vitamin D deficiency disease 04/16/2014   • Dyslipidemia 11/08/2011   • Hypothyroidism 08/25/2010   • ED (erectile dysfunction) 08/25/2010       Allergies:Patient has no known allergies.    Current Outpatient Medications   Medication Sig Dispense Refill   • cyclobenzaprine (FLEXERIL) 10 mg Tab Take 0.5-1 Tablets by mouth 3 times a day as needed for Moderate Pain. 30 tablet 0   • levothyroxine (SYNTHROID) 100 MCG Tab TAKE 1 TABLET BY MOUTH EVERY MORNING 90 tablet 3   • sildenafil citrate (VIAGRA) 50 MG tablet Take 1 tablet by mouth as needed for Erectile Dysfunction. 20 tablet 3   • Krill Oil 1000 MG Cap Take  by mouth.     • Cholecalciferol (VITAMIN D PO) Take 2,000 Units by mouth every day.     • therapeutic multivitamin-minerals (THERAGRAN-M) TABS Take 1 Tab by mouth every day.         No current facility-administered medications for this visit.       Social History     Tobacco Use   • Smoking status: Never Smoker   • Smokeless tobacco: Former User     Types: Snuff, Chew   Substance Use Topics   • Alcohol use: Yes     Alcohol/week: 2.5 oz     Types: 5 Cans of beer per week   • Drug use: No       Family Status   Relation Name Status   • Mo  Alive   • Fa  Alive   • Sis  Alive     Family History   Problem Relation Age of Onset   • Psychiatric Illness Mother    • Diabetes Father        Review of Systems:   Constitutional: Negative for fever, chills, weight loss and malaise/fatigue.   HENT: Positive per HPI  Eyes: Negative for blurred vision.   Respiratory: Negative for cough, sputum production, shortness of breath and wheezing.    Cardiovascular: Negative for chest pain, palpitations, orthopnea and leg swelling.   Gastrointestinal: Negative for heartburn, nausea,  vomiting and abdominal pain.   Genitourinary/Renal: Positive per HPI  Musculoskeletal: Positive per HPI  Neurological: Negative for dizziness, tingling, tremors, sensory change, focal weakness and headaches.       Exam:  /74   Pulse 71   Temp 36.7 °C (98.1 °F) (Temporal)   Resp 16   Ht 1.829 m (6')   Wt 123 kg (272 lb)   SpO2 99%   General:  Well nourished, well developed male in NAD  Skin: warm, dry, intact, no evidence of rash or concerning lesions  Head: is grossly normal.  HEENT: eyes clear, conjunctiva normal, PERRLA  Pulmonary: Clear to ausculation. Normal effort. No rales, ronchi, or wheezing.  Cardiovascular: Regular rate and rhythm without murmur. Carotid and radial pulses are intact and equal bilaterally.  Abdomen: soft, non-tender, positive bowel sounds  Musculoskeletal: no clubbing, cyanosis, or edema.  Psych/mental: no depression, anxiety, hallucinations  Neuro: alert, intact, CN 2-12 grossly intact    Medical decision-making and discussion:    As mentioned above referral was placed to spine surgeon, also E consulted was completed for ENT in the setting of his chronic worsening tinnitus.  Medications have been refilled, he is to take them as prescribed.  He is also due for routine fasting labs, he will be notified of results and further actions if needed.        Assessment/Plan:  Devon was seen today for back pain and knee pain.    Diagnoses and all orders for this visit:    Chronic midline low back pain with right-sided sciatica  -     REFERRAL TO SPINE SURGERY    Vitamin D deficiency disease  -     VITAMIN D,25 HYDROXY; Future    Dyslipidemia  -     Comp Metabolic Panel; Future  -     Lipid Profile; Future    Encounter for screening for hematologic disorder  -     CBC WITH DIFFERENTIAL; Future    Hypothyroidism, unspecified type  -     TSH WITH REFLEX TO FT4; Future  -     levothyroxine (SYNTHROID) 100 MCG Tab; TAKE 1 TABLET BY MOUTH EVERY MORNING    Chronic midline low back pain  -      cyclobenzaprine (FLEXERIL) 10 mg Tab; Take 0.5-1 Tablets by mouth 3 times a day as needed for Moderate Pain.    Erectile dysfunction, unspecified erectile dysfunction type  -     sildenafil citrate (VIAGRA) 50 MG tablet; Take 1 tablet by mouth as needed for Erectile Dysfunction.    Spinal stenosis of lumbar region, unspecified whether neurogenic claudication present    Tinnitus of both ears  -     Cancel: ED CONSULT TO ENT  -     REFERRAL TO ENT         Return if symptoms worsen or fail to improve, for Follow-up.    Please note that this dictation was created using voice recognition software. I have made every reasonable attempt to correct obvious errors, but I expect that there are errors of grammar and possibly content that I did not discover before finalizing the note.

## 2021-04-14 NOTE — ASSESSMENT & PLAN NOTE
Patient does struggle with erectile dysfunction for which he uses sildenafil as needed  Has difficulty initiating and maintaining erection  Reports decreased to nonexistent morning erections  Discussed various causes  He does understand if symptoms do not improve with Viagra we will need to consider referral to urology

## 2021-04-14 NOTE — ASSESSMENT & PLAN NOTE
The ASCVD Risk score ([place] SOUTH Jr, et al., 2013) failed to calculate.  Patient not currently on statin but past due for labs  Discussed the importance of ongoing healthy lifestyle modifications

## 2021-04-14 NOTE — ASSESSMENT & PLAN NOTE
Patient does struggle with tinnitus and has impaired hearing  We talked about multiple treatment options including SSRIs and a referral to ENT  He does agree to referral to ENT for further evaluation

## 2021-04-14 NOTE — ASSESSMENT & PLAN NOTE
Patient has chronic low back pain associated with lumbar stenosis, previously followed by spine specialist, has not been seen by specialist in over 5 years, he has undergone 2 surgeries  He has associated sciatica especially on the right lower extremity  Reports the pain is starting to increase and become very bothersome  Currently takes cyclobenzaprine as needed and does need a refill  He leaves it is time to be seen again by specialist discuss other treatment options, referral placed denies red flag warning symptoms  He also reports to me he has been doing physical therapy exercises at home but does decline a new referral

## 2021-04-14 NOTE — ASSESSMENT & PLAN NOTE
This is chronic and stable, status of control unknown has he has not had labs done in well over a year takes levothyroxine 100 mcg daily  Denies symptoms associated with this

## 2021-04-20 ENCOUNTER — HOSPITAL ENCOUNTER (OUTPATIENT)
Dept: LAB | Facility: MEDICAL CENTER | Age: 61
End: 2021-04-20
Attending: NURSE PRACTITIONER
Payer: OTHER GOVERNMENT

## 2021-04-20 DIAGNOSIS — E55.9 VITAMIN D DEFICIENCY DISEASE: ICD-10-CM

## 2021-04-20 DIAGNOSIS — E03.9 HYPOTHYROIDISM, UNSPECIFIED TYPE: ICD-10-CM

## 2021-04-20 DIAGNOSIS — Z13.0 ENCOUNTER FOR SCREENING FOR HEMATOLOGIC DISORDER: ICD-10-CM

## 2021-04-20 DIAGNOSIS — E78.5 DYSLIPIDEMIA: ICD-10-CM

## 2021-04-20 PROCEDURE — 80053 COMPREHEN METABOLIC PANEL: CPT

## 2021-04-20 PROCEDURE — 82306 VITAMIN D 25 HYDROXY: CPT

## 2021-04-20 PROCEDURE — 84443 ASSAY THYROID STIM HORMONE: CPT

## 2021-04-20 PROCEDURE — 85025 COMPLETE CBC W/AUTO DIFF WBC: CPT

## 2021-04-20 PROCEDURE — 36415 COLL VENOUS BLD VENIPUNCTURE: CPT

## 2021-04-20 PROCEDURE — 80061 LIPID PANEL: CPT

## 2021-04-21 LAB
ALBUMIN SERPL BCP-MCNC: 4.2 G/DL (ref 3.2–4.9)
ALBUMIN/GLOB SERPL: 1.6 G/DL
ALP SERPL-CCNC: 60 U/L (ref 30–99)
ALT SERPL-CCNC: 22 U/L (ref 2–50)
ANION GAP SERPL CALC-SCNC: 6 MMOL/L (ref 7–16)
AST SERPL-CCNC: 27 U/L (ref 12–45)
BASOPHILS # BLD AUTO: 0.2 % (ref 0–1.8)
BASOPHILS # BLD: 0.02 K/UL (ref 0–0.12)
BILIRUB SERPL-MCNC: 0.7 MG/DL (ref 0.1–1.5)
BUN SERPL-MCNC: 14 MG/DL (ref 8–22)
CALCIUM SERPL-MCNC: 9.4 MG/DL (ref 8.5–10.5)
CHLORIDE SERPL-SCNC: 102 MMOL/L (ref 96–112)
CHOLEST SERPL-MCNC: 230 MG/DL (ref 100–199)
CO2 SERPL-SCNC: 26 MMOL/L (ref 20–33)
CREAT SERPL-MCNC: 0.99 MG/DL (ref 0.5–1.4)
EOSINOPHIL # BLD AUTO: 0.1 K/UL (ref 0–0.51)
EOSINOPHIL NFR BLD: 1.2 % (ref 0–6.9)
ERYTHROCYTE [DISTWIDTH] IN BLOOD BY AUTOMATED COUNT: 45.3 FL (ref 35.9–50)
FASTING STATUS PATIENT QL REPORTED: NORMAL
GLOBULIN SER CALC-MCNC: 2.6 G/DL (ref 1.9–3.5)
GLUCOSE SERPL-MCNC: 96 MG/DL (ref 65–99)
HCT VFR BLD AUTO: 44.9 % (ref 42–52)
HDLC SERPL-MCNC: 52 MG/DL
HGB BLD-MCNC: 15.4 G/DL (ref 14–18)
IMM GRANULOCYTES # BLD AUTO: 0.03 K/UL (ref 0–0.11)
IMM GRANULOCYTES NFR BLD AUTO: 0.4 % (ref 0–0.9)
LDLC SERPL CALC-MCNC: 145 MG/DL
LYMPHOCYTES # BLD AUTO: 2.33 K/UL (ref 1–4.8)
LYMPHOCYTES NFR BLD: 28.5 % (ref 22–41)
MCH RBC QN AUTO: 33.8 PG (ref 27–33)
MCHC RBC AUTO-ENTMCNC: 34.3 G/DL (ref 33.7–35.3)
MCV RBC AUTO: 98.7 FL (ref 81.4–97.8)
MONOCYTES # BLD AUTO: 0.75 K/UL (ref 0–0.85)
MONOCYTES NFR BLD AUTO: 9.2 % (ref 0–13.4)
NEUTROPHILS # BLD AUTO: 4.95 K/UL (ref 1.82–7.42)
NEUTROPHILS NFR BLD: 60.5 % (ref 44–72)
NRBC # BLD AUTO: 0 K/UL
NRBC BLD-RTO: 0 /100 WBC
PLATELET # BLD AUTO: 216 K/UL (ref 164–446)
PMV BLD AUTO: 10.1 FL (ref 9–12.9)
POTASSIUM SERPL-SCNC: 4.7 MMOL/L (ref 3.6–5.5)
PROT SERPL-MCNC: 6.8 G/DL (ref 6–8.2)
RBC # BLD AUTO: 4.55 M/UL (ref 4.7–6.1)
SODIUM SERPL-SCNC: 134 MMOL/L (ref 135–145)
TRIGL SERPL-MCNC: 167 MG/DL (ref 0–149)
TSH SERPL DL<=0.005 MIU/L-ACNC: 4.59 UIU/ML (ref 0.38–5.33)
WBC # BLD AUTO: 8.2 K/UL (ref 4.8–10.8)

## 2021-04-22 LAB — 25(OH)D3 SERPL-MCNC: 13 NG/ML (ref 30–80)

## 2021-04-28 DIAGNOSIS — E55.9 VITAMIN D DEFICIENCY DISEASE: ICD-10-CM

## 2021-04-28 RX ORDER — ERGOCALCIFEROL 1.25 MG/1
50000 CAPSULE ORAL
Qty: 12 CAPSULE | Refills: 1 | Status: SHIPPED | OUTPATIENT
Start: 2021-04-28 | End: 2022-02-02

## 2021-05-18 DIAGNOSIS — R05.9 COUGH: ICD-10-CM

## 2021-05-18 RX ORDER — BENZONATATE 100 MG/1
100 CAPSULE ORAL 3 TIMES DAILY PRN
Qty: 30 CAPSULE | Refills: 0 | Status: SHIPPED | OUTPATIENT
Start: 2021-05-18 | End: 2022-02-02

## 2022-02-02 ENCOUNTER — OFFICE VISIT (OUTPATIENT)
Dept: URGENT CARE | Facility: PHYSICIAN GROUP | Age: 62
End: 2022-02-02
Payer: OTHER GOVERNMENT

## 2022-02-02 ENCOUNTER — APPOINTMENT (OUTPATIENT)
Dept: RADIOLOGY | Facility: IMAGING CENTER | Age: 62
End: 2022-02-02
Attending: FAMILY MEDICINE
Payer: OTHER GOVERNMENT

## 2022-02-02 VITALS
OXYGEN SATURATION: 95 % | TEMPERATURE: 97.2 F | SYSTOLIC BLOOD PRESSURE: 122 MMHG | WEIGHT: 247.4 LBS | BODY MASS INDEX: 33.51 KG/M2 | HEART RATE: 59 BPM | HEIGHT: 72 IN | DIASTOLIC BLOOD PRESSURE: 70 MMHG | RESPIRATION RATE: 16 BRPM

## 2022-02-02 DIAGNOSIS — S46.211A BICEPS TENDON RUPTURE, PROXIMAL, RIGHT, INITIAL ENCOUNTER: ICD-10-CM

## 2022-02-02 DIAGNOSIS — S46.911A STRAIN OF RIGHT SHOULDER, INITIAL ENCOUNTER: ICD-10-CM

## 2022-02-02 PROCEDURE — 73030 X-RAY EXAM OF SHOULDER: CPT | Mod: TC,FY,RT | Performed by: FAMILY MEDICINE

## 2022-02-02 PROCEDURE — 99214 OFFICE O/P EST MOD 30 MIN: CPT | Performed by: FAMILY MEDICINE

## 2022-02-02 ASSESSMENT — ENCOUNTER SYMPTOMS
TINGLING: 0
SORE THROAT: 0
MYALGIAS: 0
COUGH: 0
SHORTNESS OF BREATH: 0
NAUSEA: 0
FOCAL WEAKNESS: 0
SENSORY CHANGE: 0
NUMBNESS: 0
FEVER: 0
VOMITING: 0
CHILLS: 0

## 2022-02-02 ASSESSMENT — FIBROSIS 4 INDEX: FIB4 SCORE: 1.57

## 2022-02-02 ASSESSMENT — PAIN SCALES - GENERAL: PAINLEVEL: 8=MODERATE-SEVERE PAIN

## 2022-02-03 NOTE — PROGRESS NOTES
"Subjective:   Devon Newton is a 61 y.o. male who presents for Shoulder Injury (pt was swinging golf club, felt a big \"pop\" and started experiencing pain in his bicep, pt has swelling around shoulder)        Shoulder Injury   Incident location: Golfing. The right shoulder is affected. The incident occurred 1 to 3 hours ago. Injury mechanism: Swinging a golf club and experiencing immediate pain right shoulder and noted immediate swelling distal aspect right arm with gradual onset of bruising and swelling distal aspect right arm. The quality of the pain is described as aching. The pain is moderate. Pertinent negatives include no numbness or tingling. He has tried rest for the symptoms. The treatment provided no relief.     PMH:  has a past medical history of Backpain, Dyslipidemia (11/8/2011), ED (erectile dysfunction) (8/25/2010), Hypothyroidism (8/25/2010), and Vitamin d deficiency (5/2/2012).  MEDS:   Current Outpatient Medications:   •  cyclobenzaprine (FLEXERIL) 10 mg Tab, Take 0.5-1 Tablets by mouth 3 times a day as needed for Moderate Pain., Disp: 30 tablet, Rfl: 0  •  levothyroxine (SYNTHROID) 100 MCG Tab, TAKE 1 TABLET BY MOUTH EVERY MORNING, Disp: 90 tablet, Rfl: 3  •  sildenafil citrate (VIAGRA) 50 MG tablet, Take 1 tablet by mouth as needed for Erectile Dysfunction., Disp: 20 tablet, Rfl: 3  •  Cholecalciferol (VITAMIN D PO), Take 2,000 Units by mouth every day., Disp: , Rfl:   •  therapeutic multivitamin-minerals (THERAGRAN-M) TABS, Take 1 Tab by mouth every day.  , Disp: , Rfl:   ALLERGIES: No Known Allergies  SURGHX:   Past Surgical History:   Procedure Laterality Date   • LUMBAR LAMINECTOMY DISKECTOMY  3/18/2011    Performed by RAJ VALADEZ at [facility]   • LUMBAR DECOMPRESSION  3/18/2011    Performed by RAJ VALADEZ at [facility]   • LAMINOTOMY     • OTHER ORTHOPEDIC SURGERY      back surgery 2002 L4     SOCHX:  reports that he has never smoked. He quit smokeless tobacco " use about 5 years ago.  His smokeless tobacco use included snuff and chew. He reports current alcohol use of about 2.5 oz of alcohol per week. He reports that he does not use drugs.  FH:   Family History   Problem Relation Age of Onset   • Psychiatric Illness Mother    • Diabetes Father      Review of Systems   Constitutional: Negative for chills and fever.   HENT: Negative for sore throat.    Respiratory: Negative for cough and shortness of breath.    Gastrointestinal: Negative for nausea and vomiting.   Musculoskeletal: Negative for myalgias.   Skin: Negative for rash.   Neurological: Negative for tingling, sensory change, focal weakness and numbness.        Objective:   /70   Pulse (!) 59   Temp 36.2 °C (97.2 °F)   Resp 16   Ht 1.829 m (6')   Wt 112 kg (247 lb 6.4 oz) Comment: pt was wearing shoes  SpO2 95%   BMI 33.55 kg/m²   Physical Exam  Vitals and nursing note reviewed.   Musculoskeletal:      Right shoulder: Tenderness (Mildly tender to palpation reciprocated) present. No bony tenderness. Decreased range of motion (Guarding noted). Normal strength.      Right upper arm: Swelling and deformity (Right arm deformity, distal mass palpated consistent with bicep, diffuse ecchymosis and edema, mildly tender to palpation) present.      Comments: Range of motion intact to elbow flexion extension with motor 5 out of 5 to flexion and extension, supination and pronation     DX-SHOULDER 2+ RIGHT  Order: [id number]   Status: Final result     Visible to patient: No (scheduled for 2/3/2022  3:51 PM)     Dx: Strain of right shoulder, initial enc...     0 Result Notes    Details    Reading Physician Reading Date Result Priority   Nagi Cobb M.D.  [phone] 2/2/2022 Urgent Care     Narrative & Impression     2/2/2022 5:20 PM     HISTORY/REASON FOR EXAM:  Pain/Deformity Following Trauma        TECHNIQUE/EXAM DESCRIPTION AND NUMBER OF VIEWS:  3 views of the RIGHT shoulder.     COMPARISON:  1/5/2016     FINDINGS:  Small well-corticated fragment adjacent to the distal clavicle, likely old trauma.  No acute fracture or dislocation.  No significant osteoarthritis.        IMPRESSION:        1. No acute osseous abnormality.             Exam Ended: 02/02/22  5:37 PM Last Resulted: 02/02/22  5:49 PM                 Assessment/Plan:   1. Biceps tendon rupture, proximal, right, initial encounter  - DX-SHOULDER 2+ RIGHT; Future  - Slings  - Referral to Sports Medicine    2. Strain of right shoulder, initial encounter  - DX-SHOULDER 2+ RIGHT; Future  - Slings  - Referral to Sports Medicine        Medical Decision Making/Course:  In the course of preparing for this visit with review of the pertinent past medical history, recent and past clinic visits, current medications, and performing chart, immunization, medical history and medication reconciliation, and in the further course of obtaining the current history pertinent to the clinic visit today, performing an exam and evaluation, ordering and independently evaluating labs, tests including independent interpretation and evaluation of x-ray imaging, and/or procedures, prescribing any recommended new medications as noted above, providing any pertinent counseling and education and recommending further coordination of care including initiation of consultation with sports medicine clinic, at least  45 minutes of total time were spent during this encounter.      Discussed close monitoring, return precautions, and supportive measures of maintaining adequate fluid hydration and caloric intake, relative rest and symptom management as needed for pain and/or fever.    Differential diagnosis, natural history, supportive care, and indications for immediate follow-up discussed.     Advised the patient to follow-up with the primary care physician for recheck, reevaluation, and consideration of further management.    Please note that this dictation was created using voice  recognition software. I have worked with consultants from the vendor as well as technical experts from [facility] to optimize the interface. I have made every reasonable attempt to correct obvious errors, but I expect that there are errors of grammar and possibly content that I did not discover before finalizing the note.

## 2022-02-03 NOTE — PATIENT INSTRUCTIONS
Proximal Biceps Tendon Tear    The proximal biceps tendon is a strong cord of tissue that connects the biceps muscle--which is the muscle on the front of the upper arm--to the shoulder blade. A proximal biceps tendon tear (rupture) can include a partial or complete tear of the tendon near where it connects to the bone of the shoulder.  This injury can interfere with your ability to lift your arm in front of your body, stabilize your shoulder, bend your elbow, and turn your hand palm-up (supination).  What are the causes?  This condition happens when too much force is placed on the tendon. This excess force may be caused by:  · The elbow being suddenly straightened from a bent position because of an external force. This could happen, for example, while catching a heavy weight or being pulled when waterskiing.  · Wear and tear from physical activity.  · Breaking a fall with your hand.  What increases the risk?  The following factors may make you more likely to develop this condition:  · Playing contact sports.  · Doing activities or sports that involve throwing or overhead movements, such as racket sports, gymnastics, or baseball.  · Doing activities or sports that involve putting sudden force on the arm, such as weight lifting or waterskiing.  · Having a weakened tendon because of:  ? Long-lasting (chronic) biceps tendinitis.  ? Certain medical conditions, such as diabetes or rheumatoid arthritis.  ? Repeated corticosteroid use.  ? Repetitive overhead movements.  What are the signs or symptoms?  Symptoms of this condition may include:  · Sudden sharp pain in the front of the shoulder. Pain may get worse during certain movements, such as:  ? Lifting or carrying objects.  ? Straightening the elbow.  ? Throwing or using overhead movements.  · Inflammation or a feeling of unusual warmth on the front of the shoulder.  · Painful tightening (spasm) of the biceps muscle.  · A bulge on the inside of the upper arm when the  elbow is bent.  · Bruising in the shoulder or upper arm. This may develop 24-48 hours after the tendon is injured.  · Limited range of motion of the shoulder and elbow.  · Weakness in the elbow and forearm when:  ? Bending the elbow.  ? Rotating the wrist.  How is this diagnosed?  This condition may be diagnosed based on:  · Your symptoms and medical history.  · A physical exam. Your health care provider may test the strength and range of motion of your shoulder and elbow.  · Imaging tests, such as:  ? X-rays.  ? MRI.  ? Ultrasound.  How is this treated?  Treatment depends on the severity of the condition. Treatment may include:  · Medicines to help relieve pain and inflammation.  · Resting and icing the injured area.  · Avoiding certain activities that put stress on your shoulder.  · Physical therapy.  · Surgery to repair the tear. This may be needed if nonsurgical treatments do not improve your condition.  · One or more injections of medicines (corticosteroids) into your upper arm to help reduce inflammation. This treatment is rare.  Follow these instructions at home:  Managing pain, stiffness, and swelling         · If directed, put ice on the injured area:  ? Put ice in a plastic bag.  ? Place a towel between your skin and the bag.  ? Leave the ice on for 20 minutes, 2-3 times a day.  · If directed, apply heat to the affected area before you exercise or as often as told by your health care provider. Use the heat source that your health care provider recommends, such as a moist heat pack or a heating pad.  ? Place a towel between your skin and the heat source.  ? Leave the heat on for 20-30 minutes.  ? Remove the heat if your skin turns bright red. This is especially important if you are unable to feel pain, heat, or cold. You may have a greater risk of getting burned.  · Move your fingers often to avoid stiffness and to lessen swelling.  · Raise (elevate) the injured area while you are sitting or lying  down.  Activity  · Return to your normal activities as told by your health care provider. Ask your health care provider what activities are safe for you.  · Avoid activities that cause pain or make your condition worse.  · Do not lift anything that is heavier than 10 lb (4.5 kg), or the limit that you are told, until your health care provider says that it is safe.  · Do exercises as told by your physical therapist or health care provider.  General instructions  · Take over-the-counter and prescription medicines only as told by your health care provider.  · Do not use any products that contain nicotine or tobacco, such as cigarettes and e-cigarettes. If you need help quitting, ask your health care provider.  · Keep all follow-up visits as told by your health care provider. This is important.  How is this prevented?  Take these steps to help prevent reinjury:  · Warm up and stretch before being active.  · Cool down and stretch after being active.  · Give your body time to rest between periods of activity.  · Make sure you use equipment that fits you.  · Be safe and responsible while being active. This will help you avoid falls.  · Maintain physical fitness, including strength and flexibility.  Contact a health care provider if:  · You have symptoms that get worse or do not get better after 2 weeks of treatment.  · You develop new symptoms.  Get help right away if:  · You have severe pain.  · You develop pain or numbness in your hand.  · Your hand feels unusually cold.  · Your fingernails turn a dark color, such as blue or gray.  Summary  · A proximal biceps tendon tear can include a partial or complete tear of the tendon near where it connects to the bone of the shoulder.  · This condition happens when too much force is placed on the tendon.  · Treatment may include resting and icing the injured area.  · Avoid activities that cause pain or make your condition worse.  This information is not intended to replace advice  given to you by your health care provider. Make sure you discuss any questions you have with your health care provider.  Document Released: 12/18/2006 Document Revised: 12/09/2019 Document Reviewed: 05/13/2019  Elsevier Patient Education © 2020 [employer]

## 2022-02-09 ENCOUNTER — OFFICE VISIT (OUTPATIENT)
Dept: MEDICAL GROUP | Facility: OTHER | Age: 62
End: 2022-02-09
Payer: OTHER GOVERNMENT

## 2022-02-09 VITALS
WEIGHT: 247 LBS | SYSTOLIC BLOOD PRESSURE: 146 MMHG | BODY MASS INDEX: 33.46 KG/M2 | HEART RATE: 94 BPM | DIASTOLIC BLOOD PRESSURE: 100 MMHG | TEMPERATURE: 98.8 F | RESPIRATION RATE: 16 BRPM | HEIGHT: 72 IN

## 2022-02-09 DIAGNOSIS — M25.511 CHRONIC RIGHT SHOULDER PAIN: ICD-10-CM

## 2022-02-09 DIAGNOSIS — G89.29 CHRONIC RIGHT SHOULDER PAIN: ICD-10-CM

## 2022-02-09 PROCEDURE — 99213 OFFICE O/P EST LOW 20 MIN: CPT | Performed by: FAMILY MEDICINE

## 2022-02-09 ASSESSMENT — FIBROSIS 4 INDEX: FIB4 SCORE: 1.57

## 2022-02-09 ASSESSMENT — ENCOUNTER SYMPTOMS
SORE THROAT: 0
NAUSEA: 0
FEVER: 0
SHORTNESS OF BREATH: 0
EYE REDNESS: 0
DIARRHEA: 0
BRUISES/BLEEDS EASILY: 0
COUGH: 0
TINGLING: 0
CHILLS: 0
FOCAL WEAKNESS: 0
PALPITATIONS: 0
HEARTBURN: 0
VOMITING: 0

## 2022-02-09 NOTE — PROGRESS NOTES
Subjective:   CC:   Chief Complaint   Patient presents with   • Shoulder Pain     right        HPI: Devon is a 61 y.o. male who presents today for the following problems:    Problem   Right Shoulder Pain    Devon is a new patient to me.  He is referred from the urgent care.  His history is that he had had right shoulder pain for several years with almost all activities.  He was wrestling his dog and felt a pop.  Since that time the shoulder pain has got gone away.  He can lift himself up and carry stuff without any pain.  Previously if he carried 5 pounds it hurt.  He then was golfing and felt another pop.  At that time the pop, swelling in his biceps and elbow.  It was a painful pop but afterward his pain in his shoulder has resolved but he has had pain and swelling in the biceps and elbow region.  He is a left[place]hand [place].         Current Outpatient Medications   Medication Sig Dispense Refill   • cyclobenzaprine (FLEXERIL) 10 mg Tab Take 0.5-1 Tablets by mouth 3 times a day as needed for Moderate Pain. 30 tablet 0   • levothyroxine (SYNTHROID) 100 MCG Tab TAKE 1 TABLET BY MOUTH EVERY MORNING 90 tablet 3   • sildenafil citrate (VIAGRA) 50 MG tablet Take 1 tablet by mouth as needed for Erectile Dysfunction. 20 tablet 3   • Cholecalciferol (VITAMIN D PO) Take 2,000 Units by mouth every day.     • therapeutic multivitamin-minerals (THERAGRAN-M) TABS Take 1 Tab by mouth every day.         No current facility-administered medications for this visit.       Social History     Socioeconomic History   • Marital status:      Spouse name: Not on file   • Number of children: Not on file   • Years of education: Not on file   • Highest education level: Not on file   Occupational History   • Not on file   Tobacco Use   • Smoking status: Never Smoker   • Smokeless tobacco: Former User     Types: Snuff, Chew   Substance and Sexual Activity   • Alcohol use: Yes     Alcohol/week: 2.5 oz     Types: 5  Cans of beer per week   • Drug use: No   • Sexual activity: Yes     Partners: Female   Other Topics Concern   • Not on file   Social History Narrative   • Not on file     Social Determinants of Health     Financial Resource Strain:    • Difficulty of Paying Living Expenses: Not on file   Food Insecurity:    • Worried About Running Out of Food in the Last Year: Not on file   • Ran Out of Food in the Last Year: Not on file   Transportation Needs:    • Lack of Transportation (Medical): Not on file   • Lack of Transportation (Non-Medical): Not on file   Physical Activity:    • Days of Exercise per Week: Not on file   • Minutes of Exercise per Session: Not on file   Stress:    • Feeling of Stress : Not on file   Social Connections:    • Frequency of Communication with Friends and Family: Not on file   • Frequency of Social Gatherings with Friends and Family: Not on file   • Attends Voodoo Services: Not on file   • Active Member of Clubs or Organizations: Not on file   • Attends Club or Organization Meetings: Not on file   • Marital Status: Not on file   Intimate Partner Violence:    • Fear of Current or Ex-Partner: Not on file   • Emotionally Abused: Not on file   • Physically Abused: Not on file   • Sexually Abused: Not on file   Housing Stability:    • Unable to Pay for Housing in the Last Year: Not on file   • Number of Places Lived in the Last Year: Not on file   • Unstable Housing in the Last Year: Not on file       Review of Systems   Constitutional: Negative for chills and fever.   HENT: Negative for sore throat.    Eyes: Negative for redness.   Respiratory: Negative for cough and shortness of breath.    Cardiovascular: Negative for chest pain and palpitations.   Gastrointestinal: Negative for diarrhea, heartburn, nausea and vomiting.   Skin: Negative for rash.   Neurological: Negative for tingling and focal weakness.   Endo/Heme/Allergies: Does not bruise/bleed easily.       Objective:     Vitals:    02/09/22  0825   BP: 146/100   Pulse: 94   Resp: 16   Temp: 37.1 °C (98.8 °F)   Weight: 112 kg (247 lb)   Height: 1.829 m (6')     Body mass index is 33.5 kg/m².     Physical Exam:  Gen: Well developed, well nourished in no acute distress.   Skin: Pink, warm, and dry  HEENT: conjunctiva non-injected  Neck: Supple  Ext: no edema  Alert and oriented Eye contact is good, speech goal directed, affect calm  r shoulder  No muscle atrophy  Normal AROM. Abduction, F/E, IR/ER  Normal cervical ROM without pain  Negative impingement tests - Stratton/Neer  No tenderness to palpation of the AC, SC, Acromion, GT, LT, Biceps groove, Coracoid  No trigger points palpable  Normal rotator cuff strength  No instability  No laxity  Negative apprehension  Negative labral tests  Negative biceps tests  Negative scarf sign  Normal sensation to light touch  With flexion of the elbow on the right compared to the left  Market bruising of the biceps and down to the medial elbow but full range of motion of the elbow.  All the above testing causes some pain to the biceps region but no pain in the shoulder whatsoever.  Obvious bump at the AC joint but no pain obvious crepitus at the AC joint with shoulder shrug but no pain reviewed x-ray with patient.    Assessment & Plan:   Right shoulder pain  His exam is essentially benign for his shoulder but he does have Jamaal muscle and most likely a rupture of the long head of his biceps.  Short head of the biceps and distal biceps appears intact on exam.  I suspect that his shoulder pain is gone for now due to the biceps rupture and that his elbow and biceps pain will go away as soon as the swelling and bruising is gone.  Recommend he do activities as tolerated.  NSAIDs may help with pain that may increase bruising.  He should give it time and I think with time his symptoms were all resolved.  He knows that if he is uncomfortable with the bump on his right bicep compared to the left without be a cosmetic reason for  surgery.  Functionally he should do just fine without surgery.  I also reviewed his x-rays done from the urgent care which show increased calcification of the distal clavicle.  He never had a clavicle fracture but thinks he had a shoulder injury which he thought was a dislocation but may have been an AC separation in the past.  He never had to have it reduced.  It occurred during football many years ago.  Since then he said crepitus of the AC joint but no pain.  Suspect this is an old injury.  Glenohumeral joint looks normal as does rotator cuff attachments.  We will follow him up if he has any residual pain.      Followup: Return if symptoms worsen or fail to improve.    Jensen Oconnor M.D.    Please note that this dictation was created using voice recognition software. I have made every reasonable attempt to correct obvious errors, but I expect that there are errors of grammar and possibly content that I did not discover before finalizing the note.

## 2022-02-09 NOTE — ASSESSMENT & PLAN NOTE
His exam is essentially benign for his shoulder but he does have Jamaal muscle and most likely a rupture of the long head of his biceps.  Short head of the biceps and distal biceps appears intact on exam.  I suspect that his shoulder pain is gone for now due to the biceps rupture and that his elbow and biceps pain will go away as soon as the swelling and bruising is gone.  Recommend he do activities as tolerated.  NSAIDs may help with pain that may increase bruising.  He should give it time and I think with time his symptoms were all resolved.  He knows that if he is uncomfortable with the bump on his right bicep compared to the left without be a cosmetic reason for surgery.  Functionally he should do just fine without surgery.  I also reviewed his x-rays done from the urgent care which show increased calcification of the distal clavicle.  He never had a clavicle fracture but thinks he had a shoulder injury which he thought was a dislocation but may have been an AC separation in the past.  He never had to have it reduced.  It occurred during football many years ago.  Since then he said crepitus of the AC joint but no pain.  Suspect this is an old injury.  Glenohumeral joint looks normal as does rotator cuff attachments.  We will follow him up if he has any residual pain.

## 2022-02-23 ENCOUNTER — PATIENT MESSAGE (OUTPATIENT)
Dept: MEDICAL GROUP | Facility: PHYSICIAN GROUP | Age: 62
End: 2022-02-23
Payer: OTHER GOVERNMENT

## 2022-02-23 DIAGNOSIS — R05.9 COUGH: ICD-10-CM

## 2022-02-24 RX ORDER — BENZONATATE 100 MG/1
100 CAPSULE ORAL 3 TIMES DAILY PRN
Qty: 30 CAPSULE | Refills: 0 | Status: SHIPPED | OUTPATIENT
Start: 2022-02-24 | End: 2023-05-24

## 2022-05-17 ENCOUNTER — OFFICE VISIT (OUTPATIENT)
Dept: MEDICAL GROUP | Facility: OTHER | Age: 62
End: 2022-05-17
Payer: OTHER GOVERNMENT

## 2022-05-17 VITALS
HEART RATE: 68 BPM | WEIGHT: 241 LBS | DIASTOLIC BLOOD PRESSURE: 90 MMHG | HEIGHT: 72 IN | TEMPERATURE: 98.3 F | SYSTOLIC BLOOD PRESSURE: 120 MMHG | OXYGEN SATURATION: 96 % | BODY MASS INDEX: 32.64 KG/M2

## 2022-05-17 DIAGNOSIS — M25.511 CHRONIC RIGHT SHOULDER PAIN: Primary | ICD-10-CM

## 2022-05-17 DIAGNOSIS — G89.29 CHRONIC RIGHT SHOULDER PAIN: Primary | ICD-10-CM

## 2022-05-17 PROCEDURE — 99212 OFFICE O/P EST SF 10 MIN: CPT | Mod: GC | Performed by: STUDENT IN AN ORGANIZED HEALTH CARE EDUCATION/TRAINING PROGRAM

## 2022-05-17 ASSESSMENT — FIBROSIS 4 INDEX: FIB4 SCORE: 1.57

## 2022-05-17 ASSESSMENT — PATIENT HEALTH QUESTIONNAIRE - PHQ9: CLINICAL INTERPRETATION OF PHQ2 SCORE: 0

## 2022-05-17 NOTE — ASSESSMENT & PLAN NOTE
60 yo male with acute on chronic right shoulder pain most consistent with hx of biceps rupture.  Ultrasound in clinic demonstrates irregular rotator cuff tendons without obvious signs of lesion, proximal long head of the biceps disruption, and apparently intact, normal-appearing short head of the biceps at its attachment at the coracoid process.  Suspect that this is most consistent with biceps tendon etiology but plan for MRI.  May consider biceps sheath injection pending MRI results and clinical course.  Follow-up as needed.

## 2022-05-17 NOTE — PROGRESS NOTES
Subjective:   CC:   Chief Complaint   Patient presents with   • Shoulder Injury     Tore long bicep on right shoulder in [facility]       HPI: Devon is a 61 y.o. male who presents today for the following problems:    Problem   Chronic Right Shoulder Pain    CC: 1 month of worsened right shoulder pain in the setting of hx of biceps rupture months ago causing pain    O: 1 month  L: anterior  D: as above  C: numbness and tingling into forearm and moderate pain  A: sleeping on right side and using arm forcefully  R: rest and ibuprofen prn  T: nighttime or during use  Associated Sx:           Current Outpatient Medications   Medication Sig Dispense Refill   • cyclobenzaprine (FLEXERIL) 10 mg Tab Take 0.5-1 Tablets by mouth 3 times a day as needed for Moderate Pain. 30 tablet 0   • levothyroxine (SYNTHROID) 100 MCG Tab TAKE 1 TABLET BY MOUTH EVERY MORNING 90 tablet 3   • sildenafil citrate (VIAGRA) 50 MG tablet Take 1 tablet by mouth as needed for Erectile Dysfunction. 20 tablet 3   • Cholecalciferol (VITAMIN D PO) Take 2,000 Units by mouth every day.     • therapeutic multivitamin-minerals (THERAGRAN-M) TABS Take 1 Tab by mouth every day.       • benzonatate (TESSALON) 100 MG Cap Take 1 Capsule by mouth 3 times a day as needed for Cough. (Patient not taking: Reported on 5/17/2022) 30 Capsule 0     No current facility-administered medications for this visit.       Social History     Socioeconomic History   • Marital status:      Spouse name: Not on file   • Number of children: Not on file   • Years of education: Not on file   • Highest education level: Not on file   Occupational History   • Not on file   Tobacco Use   • Smoking status: Never Smoker   • Smokeless tobacco: Former User     Types: Snuff, Chew     Quit date: 1/1/2017   Substance and Sexual Activity   • Alcohol use: Yes     Alcohol/week: 2.5 oz     Types: 5 Cans of beer per week   • Drug use: No   • Sexual activity: Yes     Partners: Female   Other Topics  Concern   • Not on file   Social History Narrative   • Not on file     Social Determinants of Health     Financial Resource Strain: Not on file   Food Insecurity: Not on file   Transportation Needs: Not on file   Physical Activity: Not on file   Stress: Not on file   Social Connections: Not on file   Intimate Partner Violence: Not on file   Housing Stability: Not on file       Objective:     Vitals:    05/17/22 0802   BP: (!) 120/90   BP Location: Left arm   Patient Position: Sitting   Pulse: 68   Temp: 36.8 °C (98.3 °F)   SpO2: 96%   Weight: 109 kg (241 lb)   Height: 1.829 m (6')     Body mass index is 32.69 kg/m².     Physical Exam:  VS: reviewed  GEN: appearance nl, NAD  SKIN: no rashes or lesions, anicteric   HEAD: AT/NC  EYES: conjunctiva clear, pupils equal  EARS: hearing  normal, external ear wnl  NOSE: no discharge, external nose wnl  RESP: normal respiratory movements  EXT: no clubbing, cyanosis or edema  MSK: normal gait   NEURO: moving all four extremities w/o focal deficit; alert, awake, cooperative, answers questions appropriately  PSYCH: mood and affect appropriate; not depressed     RIGHT SHOULDER:  No muscle atrophy. 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  Normal AROM. Abduction, F/E, IR/ER  Normal PROM  Normal cervical ROM without pain  Negative impingement tests - Stratton/Neer  No tenderness to palpation of the AC, SC, Acromion, GT, LT  + TTP of biceps groove and coracoid  No trigger points palpable  Normal rotator cuff strength  No instability  No laxity  Negative apprehension  Negative labral tests  + biceps tests  Negative scarf sign  Normal sensation to light touch      Assessment & Plan:   Chronic right shoulder pain  62 yo male with acute on chronic right shoulder pain most consistent with hx of biceps rupture.  Ultrasound in clinic demonstrates irregular rotator cuff tendons without obvious signs of lesion, proximal long head of the biceps disruption, and apparently intact, normal-appearing short head  of the biceps at its attachment at the coracoid process.  Suspect that this is most consistent with biceps tendon etiology but plan for MRI.  May consider biceps sheath injection pending MRI results and clinical course.  Follow-up as needed.      Followup: Return if symptoms worsen or fail to improve.    Toi Ward D.O.    Please note that this dictation was created using voice recognition software. I have made every reasonable attempt to correct obvious errors, but I expect that there are errors of grammar and possibly content that I did not discover before finalizing the note.

## 2022-06-02 DIAGNOSIS — G89.29 CHRONIC MIDLINE LOW BACK PAIN: ICD-10-CM

## 2022-06-02 DIAGNOSIS — M54.50 CHRONIC MIDLINE LOW BACK PAIN: ICD-10-CM

## 2022-06-02 NOTE — TELEPHONE ENCOUNTER
Received request via: Patient    Was the patient seen in the last year in this department? No  Last ov 4/14/21  appt to establish care has been rescheduled by our office x2.    Does the patient have an active prescription (recently filled or refills available) for medication(s) requested? No     Requested Prescriptions     Pending Prescriptions Disp Refills   • cyclobenzaprine (FLEXERIL) 10 mg Tab 30 Tablet 0     Sig: Take 0.5-1 Tablets by mouth 3 times a day as needed for Moderate Pain.

## 2022-06-04 RX ORDER — CYCLOBENZAPRINE HCL 10 MG
5-10 TABLET ORAL 3 TIMES DAILY PRN
Qty: 30 TABLET | Refills: 0 | Status: SHIPPED | OUTPATIENT
Start: 2022-06-04 | End: 2023-05-24 | Stop reason: SDUPTHER

## 2022-09-14 ENCOUNTER — APPOINTMENT (OUTPATIENT)
Dept: RADIOLOGY | Facility: MEDICAL CENTER | Age: 62
End: 2022-09-14
Attending: STUDENT IN AN ORGANIZED HEALTH CARE EDUCATION/TRAINING PROGRAM
Payer: OTHER GOVERNMENT

## 2022-09-15 ENCOUNTER — APPOINTMENT (OUTPATIENT)
Dept: RADIOLOGY | Facility: MEDICAL CENTER | Age: 62
End: 2022-09-15
Attending: STUDENT IN AN ORGANIZED HEALTH CARE EDUCATION/TRAINING PROGRAM
Payer: OTHER GOVERNMENT

## 2022-09-15 PROCEDURE — 73221 MRI JOINT UPR EXTREM W/O DYE: CPT | Mod: RT

## 2022-09-19 DIAGNOSIS — E03.9 HYPOTHYROIDISM, UNSPECIFIED TYPE: ICD-10-CM

## 2022-09-19 DIAGNOSIS — R41.3 MEMORY CHANGES: ICD-10-CM

## 2022-09-19 DIAGNOSIS — E66.9 OBESITY (BMI 30-39.9): ICD-10-CM

## 2022-09-19 DIAGNOSIS — E55.9 VITAMIN D DEFICIENCY: ICD-10-CM

## 2022-11-14 ENCOUNTER — APPOINTMENT (OUTPATIENT)
Dept: MEDICAL GROUP | Facility: PHYSICIAN GROUP | Age: 62
End: 2022-11-14
Payer: OTHER GOVERNMENT

## 2023-05-24 ENCOUNTER — OFFICE VISIT (OUTPATIENT)
Dept: MEDICAL GROUP | Facility: PHYSICIAN GROUP | Age: 63
End: 2023-05-24
Payer: OTHER GOVERNMENT

## 2023-05-24 ENCOUNTER — HOSPITAL ENCOUNTER (OUTPATIENT)
Dept: LAB | Facility: MEDICAL CENTER | Age: 63
End: 2023-05-24
Attending: NURSE PRACTITIONER
Payer: OTHER GOVERNMENT

## 2023-05-24 VITALS
HEART RATE: 77 BPM | WEIGHT: 247 LBS | OXYGEN SATURATION: 76 % | DIASTOLIC BLOOD PRESSURE: 60 MMHG | BODY MASS INDEX: 33.46 KG/M2 | SYSTOLIC BLOOD PRESSURE: 122 MMHG | RESPIRATION RATE: 18 BRPM | TEMPERATURE: 97.2 F | HEIGHT: 72 IN

## 2023-05-24 DIAGNOSIS — R53.83 OTHER FATIGUE: ICD-10-CM

## 2023-05-24 DIAGNOSIS — H93.13 TINNITUS OF BOTH EARS: ICD-10-CM

## 2023-05-24 DIAGNOSIS — Z11.59 NEED FOR HEPATITIS C SCREENING TEST: ICD-10-CM

## 2023-05-24 DIAGNOSIS — E55.9 VITAMIN D DEFICIENCY: ICD-10-CM

## 2023-05-24 DIAGNOSIS — Z13.1 SCREENING FOR DIABETES MELLITUS: ICD-10-CM

## 2023-05-24 DIAGNOSIS — E03.9 HYPOTHYROIDISM, UNSPECIFIED TYPE: ICD-10-CM

## 2023-05-24 DIAGNOSIS — Z12.5 ENCOUNTER FOR SCREENING FOR MALIGNANT NEOPLASM OF PROSTATE: ICD-10-CM

## 2023-05-24 DIAGNOSIS — R07.89 CHEST TIGHTNESS: ICD-10-CM

## 2023-05-24 DIAGNOSIS — L91.8 SKIN TAG: ICD-10-CM

## 2023-05-24 DIAGNOSIS — E78.5 DYSLIPIDEMIA: ICD-10-CM

## 2023-05-24 DIAGNOSIS — G89.29 CHRONIC MIDLINE LOW BACK PAIN: ICD-10-CM

## 2023-05-24 DIAGNOSIS — N52.9 ERECTILE DYSFUNCTION, UNSPECIFIED ERECTILE DYSFUNCTION TYPE: ICD-10-CM

## 2023-05-24 DIAGNOSIS — M54.50 CHRONIC MIDLINE LOW BACK PAIN: ICD-10-CM

## 2023-05-24 DIAGNOSIS — H93.8X3 SENSATION OF FULLNESS IN BOTH EARS: ICD-10-CM

## 2023-05-24 LAB
ALBUMIN SERPL BCP-MCNC: 4.4 G/DL (ref 3.2–4.9)
ALBUMIN/GLOB SERPL: 1.6 G/DL
ALP SERPL-CCNC: 64 U/L (ref 30–99)
ALT SERPL-CCNC: 17 U/L (ref 2–50)
ANION GAP SERPL CALC-SCNC: 9 MMOL/L (ref 7–16)
AST SERPL-CCNC: 21 U/L (ref 12–45)
BASOPHILS # BLD AUTO: 0.8 % (ref 0–1.8)
BASOPHILS # BLD: 0.05 K/UL (ref 0–0.12)
BILIRUB SERPL-MCNC: 0.7 MG/DL (ref 0.1–1.5)
BUN SERPL-MCNC: 14 MG/DL (ref 8–22)
CALCIUM ALBUM COR SERPL-MCNC: 9.5 MG/DL (ref 8.5–10.5)
CALCIUM SERPL-MCNC: 9.8 MG/DL (ref 8.5–10.5)
CHLORIDE SERPL-SCNC: 104 MMOL/L (ref 96–112)
CHOLEST SERPL-MCNC: 232 MG/DL (ref 100–199)
CO2 SERPL-SCNC: 26 MMOL/L (ref 20–33)
CREAT SERPL-MCNC: 1.04 MG/DL (ref 0.5–1.4)
EOSINOPHIL # BLD AUTO: 0.09 K/UL (ref 0–0.51)
EOSINOPHIL NFR BLD: 1.4 % (ref 0–6.9)
ERYTHROCYTE [DISTWIDTH] IN BLOOD BY AUTOMATED COUNT: 45.1 FL (ref 35.9–50)
EST. AVERAGE GLUCOSE BLD GHB EST-MCNC: 100 MG/DL
FASTING STATUS PATIENT QL REPORTED: NORMAL
GFR SERPLBLD CREATININE-BSD FMLA CKD-EPI: 82 ML/MIN/1.73 M 2
GLOBULIN SER CALC-MCNC: 2.7 G/DL (ref 1.9–3.5)
GLUCOSE SERPL-MCNC: 92 MG/DL (ref 65–99)
HBA1C MFR BLD: 5.1 % (ref 4–5.6)
HCT VFR BLD AUTO: 45.5 % (ref 42–52)
HDLC SERPL-MCNC: 48 MG/DL
HGB BLD-MCNC: 15.5 G/DL (ref 14–18)
IMM GRANULOCYTES # BLD AUTO: 0.02 K/UL (ref 0–0.11)
IMM GRANULOCYTES NFR BLD AUTO: 0.3 % (ref 0–0.9)
LDLC SERPL CALC-MCNC: 153 MG/DL
LYMPHOCYTES # BLD AUTO: 1.75 K/UL (ref 1–4.8)
LYMPHOCYTES NFR BLD: 28 % (ref 22–41)
MCH RBC QN AUTO: 33.2 PG (ref 27–33)
MCHC RBC AUTO-ENTMCNC: 34.1 G/DL (ref 32.3–36.5)
MCV RBC AUTO: 97.4 FL (ref 81.4–97.8)
MONOCYTES # BLD AUTO: 0.54 K/UL (ref 0–0.85)
MONOCYTES NFR BLD AUTO: 8.6 % (ref 0–13.4)
NEUTROPHILS # BLD AUTO: 3.81 K/UL (ref 1.82–7.42)
NEUTROPHILS NFR BLD: 60.9 % (ref 44–72)
NRBC # BLD AUTO: 0 K/UL
NRBC BLD-RTO: 0 /100 WBC (ref 0–0.2)
PLATELET # BLD AUTO: 236 K/UL (ref 164–446)
PMV BLD AUTO: 9.6 FL (ref 9–12.9)
POTASSIUM SERPL-SCNC: 4.8 MMOL/L (ref 3.6–5.5)
PROT SERPL-MCNC: 7.1 G/DL (ref 6–8.2)
RBC # BLD AUTO: 4.67 M/UL (ref 4.7–6.1)
SODIUM SERPL-SCNC: 139 MMOL/L (ref 135–145)
TRIGL SERPL-MCNC: 156 MG/DL (ref 0–149)
WBC # BLD AUTO: 6.3 K/UL (ref 4.8–10.8)

## 2023-05-24 PROCEDURE — 80061 LIPID PANEL: CPT

## 2023-05-24 PROCEDURE — 80053 COMPREHEN METABOLIC PANEL: CPT

## 2023-05-24 PROCEDURE — G0472 HEP C SCREEN HIGH RISK/OTHER: HCPCS

## 2023-05-24 PROCEDURE — 83036 HEMOGLOBIN GLYCOSYLATED A1C: CPT

## 2023-05-24 PROCEDURE — 3078F DIAST BP <80 MM HG: CPT | Performed by: NURSE PRACTITIONER

## 2023-05-24 PROCEDURE — 84439 ASSAY OF FREE THYROXINE: CPT

## 2023-05-24 PROCEDURE — 99214 OFFICE O/P EST MOD 30 MIN: CPT | Performed by: NURSE PRACTITIONER

## 2023-05-24 PROCEDURE — 84443 ASSAY THYROID STIM HORMONE: CPT

## 2023-05-24 PROCEDURE — 84153 ASSAY OF PSA TOTAL: CPT

## 2023-05-24 PROCEDURE — 85025 COMPLETE CBC W/AUTO DIFF WBC: CPT

## 2023-05-24 PROCEDURE — 82306 VITAMIN D 25 HYDROXY: CPT

## 2023-05-24 PROCEDURE — 3074F SYST BP LT 130 MM HG: CPT | Performed by: NURSE PRACTITIONER

## 2023-05-24 PROCEDURE — 36415 COLL VENOUS BLD VENIPUNCTURE: CPT

## 2023-05-24 RX ORDER — ALBUTEROL SULFATE 90 UG/1
1-2 AEROSOL, METERED RESPIRATORY (INHALATION) EVERY 4 HOURS PRN
Qty: 1 EACH | Refills: 11 | Status: SHIPPED | OUTPATIENT
Start: 2023-05-24

## 2023-05-24 RX ORDER — CYCLOBENZAPRINE HCL 10 MG
5-10 TABLET ORAL 3 TIMES DAILY PRN
Qty: 30 TABLET | Refills: 0 | Status: SHIPPED | OUTPATIENT
Start: 2023-05-24

## 2023-05-24 RX ORDER — SILDENAFIL 50 MG/1
50 TABLET, FILM COATED ORAL PRN
Qty: 30 TABLET | Refills: 6 | Status: SHIPPED | OUTPATIENT
Start: 2023-05-24

## 2023-05-24 ASSESSMENT — PATIENT HEALTH QUESTIONNAIRE - PHQ9: CLINICAL INTERPRETATION OF PHQ2 SCORE: 0

## 2023-05-24 NOTE — ASSESSMENT & PLAN NOTE
Ran out of his med; went on it years ago because he was tired  Hasn't had it for months and has been feeling well; less fatigue

## 2023-05-24 NOTE — ASSESSMENT & PLAN NOTE
Uses albuterol inhaler rarely prn; he's outside w/his job as an  for the [employer]  Usually w/changing of the seasons from fall to winter-worse w/cold

## 2023-05-24 NOTE — ASSESSMENT & PLAN NOTE
Cleans his ears out but still has the sensation at times  Also has tinnitus; has had it for years  Would like ENT referral

## 2023-05-24 NOTE — PROGRESS NOTES
Subjective:     CC:   Chief Complaint   Patient presents with    Establish Care    Otalgia     Right ear, pressure, clogged      Other     Skin tag        HPI:   Devon presents today with    Hypothyroidism  Ran out of his med; went on it years ago because he was tired  Hasn't had it for months and has been feeling well; less fatigue    Sensation of fullness in both ears  Cleans his ears out but still has the sensation at times  Also has tinnitus; has had it for years    Skin tag  Skin tag under right arm bothersome; grew to the size it is now and stopped   Would like to have it removed    Chest tightness  Uses albuterol inhaler rarely prn; 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    Physical Exam:  Constitutional: Well-developed and well-nourished male. Not diaphoretic. No distress.   Skin: warm, dry, intact, skin tag in right axilla approx size of a marble, skin colored, no open wounds or erythema   Head: Atraumatic without lesions.  Eyes: Conjunctivae are normal. Pupils are equal, round. No scleral icterus.   Ears:  External ears unremarkable. Tms obscurred partially on right fully on left w/cerumen  Neck: Supple, trachea midline. No thyromegaly present. No cervical or supraclavicular lymphadenopathy.  Cardiovascular: Regular rate and rhythm without murmur.   Pulmonary: Clear to ausculation. Normal effort. No rales, ronchi, or wheezing.  Extremities: No cyanosis, clubbing, erythema, nor edema.   Neurological: Alert and oriented x 3.   Psychiatric:  Behavior, mood, and affect are appropriate.        Assessment & Plan:     62 y.o. male with  the following -     1. Hypothyroidism, unspecified type  - TSH WITH REFLEX TO FT4; Future    2. Dyslipidemia  - Lipid Profile; Future    3. Vitamin D deficiency disease  - VITAMIN D,25 HYDROXY (DEFICIENCY); Future    4. Other fatigue  - CBC WITH DIFFERENTIAL; Future  - Comp Metabolic Panel; Future    5. Encounter for screening for malignant neoplasm of prostate  - PROSTATE SPECIFIC AG SCREENING; Future    6. Screening for diabetes mellitus  - HEMOGLOBIN A1C; Future    7. Sensation of fullness in both ears  Lavage in office today; pt tolerated well     8. Skin tag  Will schedule appt w/Dr Stein in our office for removal     9. Chronic midline low back pain  - cyclobenzaprine (FLEXERIL) 10 mg Tab; Take 0.5-1 Tablets by mouth 3 times a day as needed for Moderate Pain.  Dispense: 30 Tablet; Refill: 0    10. Erectile dysfunction, unspecified erectile dysfunction type  - sildenafil citrate (VIAGRA) 50 MG tablet; Take 1 Tablet by mouth as needed for Erectile Dysfunction.  Dispense: 30 Tablet; Refill: 6    11. Need for hepatitis C screening test  - HCV Scrn ( [id number] 1xLife - Medicare Patients Only); Future    12. Chest tightness  - albuterol 108 (90 Base) MCG/ACT Aero Soln inhalation aerosol; Inhale 1-2 Puffs every four hours as needed for Shortness of Breath.  Dispense: 1 Each; Refill: 11         Return in about 2 months (around 2023) for skin tag removal and 1 year annual/labs.  Will message w/lab results     Please note that this dictation was created using voice recognition software. I have made every reasonable attempt to correct obvious errors, but I expect that there are errors of grammar and possibly content that I did not discover before finalizing the note.

## 2023-05-24 NOTE — ASSESSMENT & PLAN NOTE
Skin tag under right arm bothersome; grew to the size it is now and stopped   Would like to have it removed

## 2023-05-25 ENCOUNTER — TELEPHONE (OUTPATIENT)
Dept: MEDICAL GROUP | Facility: PHYSICIAN GROUP | Age: 63
End: 2023-05-25
Payer: OTHER GOVERNMENT

## 2023-05-25 LAB
25(OH)D3 SERPL-MCNC: 19 NG/ML (ref 30–100)
HCV AB SER QL: NORMAL
PSA SERPL-MCNC: 3.13 NG/ML (ref 0–4)
T4 FREE SERPL-MCNC: 1.4 NG/DL (ref 0.93–1.7)
TSH SERPL DL<=0.005 MIU/L-ACNC: 5.74 UIU/ML (ref 0.38–5.33)

## 2023-05-26 NOTE — TELEPHONE ENCOUNTER
Please find a spot for patient to come in and review labs. I don't want him to wait until August 1 as he has high cholesterol low vitamin D and mild thyroid issues. Either me or Dr. Stein are fine

## 2023-06-21 ENCOUNTER — OFFICE VISIT (OUTPATIENT)
Dept: MEDICAL GROUP | Facility: PHYSICIAN GROUP | Age: 63
End: 2023-06-21
Payer: OTHER GOVERNMENT

## 2023-06-21 VITALS
RESPIRATION RATE: 16 BRPM | WEIGHT: 244 LBS | OXYGEN SATURATION: 94 % | HEART RATE: 65 BPM | HEIGHT: 71 IN | SYSTOLIC BLOOD PRESSURE: 134 MMHG | TEMPERATURE: 98.3 F | BODY MASS INDEX: 34.16 KG/M2 | DIASTOLIC BLOOD PRESSURE: 90 MMHG

## 2023-06-21 DIAGNOSIS — E55.9 VITAMIN D DEFICIENCY: ICD-10-CM

## 2023-06-21 DIAGNOSIS — E78.5 DYSLIPIDEMIA: ICD-10-CM

## 2023-06-21 PROCEDURE — 3075F SYST BP GE 130 - 139MM HG: CPT | Performed by: NURSE PRACTITIONER

## 2023-06-21 PROCEDURE — 3080F DIAST BP >= 90 MM HG: CPT | Performed by: NURSE PRACTITIONER

## 2023-06-21 PROCEDURE — 99214 OFFICE O/P EST MOD 30 MIN: CPT | Performed by: NURSE PRACTITIONER

## 2023-06-21 ASSESSMENT — FIBROSIS 4 INDEX: FIB4 SCORE: 1.29

## 2023-06-21 NOTE — ASSESSMENT & PLAN NOTE
The 10-year ASCVD risk score (Shelton KNAPP, et al., 2019) is: 10.9%    Values used to calculate the score:      Age: 62 years      Sex: Male      Is Non- : No      Diabetic: No      Tobacco smoker: No      Systolic Blood Pressure: 134 mmHg      Is BP treated: No      HDL Cholesterol: 48 mg/dL      Total Cholesterol: 232 mg/dL     Pt wants to work on reducing his cholesterol without medication I advised him to try red yeast rice extract 1200 mg twice daily we will redo cholesterol panel in 6 months  Handout in d/c paperwork for eating plan

## 2023-06-21 NOTE — PROGRESS NOTES
"Subjective:     CC:   Chief Complaint   Patient presents with    Follow-Up     Labs       HPI:   Devon presents today with    Dyslipidemia  The 10-year ASCVD risk score (Shelton KNAPP, et al., 2019) is: 10.9%    Values used to calculate the score:      Age: 62 years      Sex: Male      Is Non- : No      Diabetic: No      Tobacco smoker: No      Systolic Blood Pressure: 134 mmHg      Is BP treated: No      HDL Cholesterol: 48 mg/dL      Total Cholesterol: 232 mg/dL     Pt wants to work on reducing his cholesterol without medication I advised him to try red yeast rice extract 1200 mg twice daily we will redo cholesterol panel in 6 months  Handout in d/c paperwork for eating plan    Vitamin D deficiency disease  Most recent value was 19  Recommending 5000 iu/day for goal of 70-80 for chronic pain              ROS per HPI    Objective:     Exam:  BP (!) 134/90 (BP Location: Left arm, Patient Position: Sitting, BP Cuff Size: Adult)   Pulse 65   Temp 36.8 °C (98.3 °F)   Resp 16   Ht 1.803 m (5' 11\")   Wt 111 kg (244 lb)   SpO2 94%   BMI 34.03 kg/m²  Body mass index is 34.03 kg/m².    Physical Exam:  Constitutional: Well-developed and well-nourished male. Not diaphoretic. No distress.   Skin: warm, dry, intact, no evidence of rash or concerning lesions  Head: Atraumatic without lesions.  Eyes: Conjunctivae are normal. Pupils are equal, round. No scleral icterus.   Ears:  External ears unremarkable.   Neck: Supple, trachea midline. No thyromegaly present. No cervical or supraclavicular lymphadenopathy.  Cardiovascular: Regular rate and rhythm without murmur. No carotid bruits noted.  Pulmonary: Clear to ausculation. Normal effort. No rales, ronchi, or wheezing.  Extremities: No cyanosis, clubbing, erythema, nor edema.   Neurological: Alert and oriented x 3.   Psychiatric:  Behavior, mood, and affect are appropriate.        Assessment & Plan:     62 y.o. male with the following -     1. " Dyslipidemia  - Lipid Profile; Future    2. Vitamin D deficiency disease  - VITAMIN D,25 HYDROXY (DEFICIENCY); Future         Return in about 6 months (around 12/21/2023) for lab results.    Please note that this dictation was created using voice recognition software. I have made every reasonable attempt to correct obvious errors, but I expect that there are errors of grammar and possibly content that I did not discover before finalizing the note.

## 2023-08-03 ENCOUNTER — APPOINTMENT (OUTPATIENT)
Dept: MEDICAL GROUP | Facility: PHYSICIAN GROUP | Age: 63
End: 2023-08-03
Payer: OTHER GOVERNMENT

## 2023-11-15 ENCOUNTER — HOSPITAL ENCOUNTER (OUTPATIENT)
Dept: RADIOLOGY | Facility: MEDICAL CENTER | Age: 63
End: 2023-11-15
Attending: OTOLARYNGOLOGY
Payer: OTHER GOVERNMENT

## 2023-11-15 DIAGNOSIS — H90.3 SENSORY HEARING LOSS, BILATERAL: ICD-10-CM

## 2023-11-15 PROCEDURE — 700117 HCHG RX CONTRAST REV CODE 255: Performed by: OTOLARYNGOLOGY

## 2023-11-15 PROCEDURE — A9579 GAD-BASE MR CONTRAST NOS,1ML: HCPCS | Performed by: OTOLARYNGOLOGY

## 2023-11-15 PROCEDURE — 70553 MRI BRAIN STEM W/O & W/DYE: CPT

## 2023-11-15 RX ADMIN — GADOTERIDOL 20 ML: 279.3 INJECTION, SOLUTION INTRAVENOUS at 08:51

## 2023-11-16 ENCOUNTER — APPOINTMENT (OUTPATIENT)
Dept: MEDICAL GROUP | Facility: PHYSICIAN GROUP | Age: 63
End: 2023-11-16
Payer: OTHER GOVERNMENT

## 2023-12-22 ENCOUNTER — OFFICE VISIT (OUTPATIENT)
Dept: URGENT CARE | Facility: PHYSICIAN GROUP | Age: 63
End: 2023-12-22
Payer: OTHER GOVERNMENT

## 2023-12-22 VITALS
TEMPERATURE: 97.7 F | OXYGEN SATURATION: 97 % | DIASTOLIC BLOOD PRESSURE: 68 MMHG | RESPIRATION RATE: 14 BRPM | WEIGHT: 253 LBS | HEIGHT: 72 IN | SYSTOLIC BLOOD PRESSURE: 126 MMHG | HEART RATE: 77 BPM | BODY MASS INDEX: 34.27 KG/M2

## 2023-12-22 DIAGNOSIS — K04.7 DENTAL INFECTION: ICD-10-CM

## 2023-12-22 PROCEDURE — 3074F SYST BP LT 130 MM HG: CPT | Performed by: PHYSICIAN ASSISTANT

## 2023-12-22 PROCEDURE — 99213 OFFICE O/P EST LOW 20 MIN: CPT | Performed by: PHYSICIAN ASSISTANT

## 2023-12-22 PROCEDURE — 3078F DIAST BP <80 MM HG: CPT | Performed by: PHYSICIAN ASSISTANT

## 2023-12-22 RX ORDER — AMOXICILLIN AND CLAVULANATE POTASSIUM 875; 125 MG/1; MG/1
1 TABLET, FILM COATED ORAL 2 TIMES DAILY
Qty: 14 TABLET | Refills: 0 | Status: SHIPPED | OUTPATIENT
Start: 2023-12-22 | End: 2023-12-29

## 2023-12-22 ASSESSMENT — ENCOUNTER SYMPTOMS
DIZZINESS: 0
CHILLS: 0
FEVER: 0
NAUSEA: 0
HEADACHES: 0
VOMITING: 0
SORE THROAT: 0
SINUS PAIN: 1
ABDOMINAL PAIN: 0

## 2023-12-22 ASSESSMENT — FIBROSIS 4 INDEX: FIB4 SCORE: 1.32

## 2023-12-22 NOTE — PROGRESS NOTES
Subjective:     CHIEF COMPLAINT  Chief Complaint   Patient presents with    Dental Pain     X 1 day, (L) upper tooth.        HPI  Devon Newton is a very pleasant 63 y.o. male who presents to the clinic with dental pain and gingival swelling x 1 day.  Pain located over his left maxillary region.  Experiencing hot and cold sensitivity.  Pain has a sanded over his left maxillary sinus.  Describes tenderness to the touch.  He has not noted any discharge or drainage.  History of dental abscesses in the past.  Multiple dental caries.  Unable to get into dentistry today.    REVIEW OF SYSTEMS  Review of Systems   Constitutional:  Negative for chills, fever and malaise/fatigue.   HENT:  Positive for sinus pain. Negative for sore throat.         Dental pain and gingival swelling x 1 day   Gastrointestinal:  Negative for abdominal pain, nausea and vomiting.   Neurological:  Negative for dizziness and headaches.       PAST MEDICAL HISTORY  Patient Active Problem List    Diagnosis Date Noted    Sensation of fullness in both ears 05/24/2023    Skin tag 05/24/2023    Chest tightness 05/24/2023    Chronic right shoulder pain 05/17/2022    Pain and swelling of right knee 06/21/2019    Obesity (BMI 30-39.9) 09/01/2017    Tinnitus of both ears 08/04/2017    Cervical radiculopathy 03/18/2016    Lumbar stenosis 03/18/2016    Memory changes 05/13/2015    Chronic back pain 05/11/2015    Right shoulder pain 04/16/2014    Vitamin D deficiency disease 04/16/2014    Dyslipidemia 11/08/2011    Hypothyroidism 08/25/2010    ED (erectile dysfunction) 08/25/2010       SURGICAL HISTORY   has a past surgical history that includes laminotomy; other orthopedic surgery; lumbar laminectomy diskectomy (3/18/2011); and lumbar decompression (3/18/2011).    ALLERGIES  No Known Allergies    CURRENT MEDICATIONS  Home Medications       Reviewed by Antoni Meyer P.A.-C. (Physician Assistant) on 12/22/23 at 1520  Med List Status: <None>     Medication Last  Dose Status   albuterol 108 (90 Base) MCG/ACT Aero Soln inhalation aerosol PRN Active   cyclobenzaprine (FLEXERIL) 10 mg Tab PRN Active   sildenafil citrate (VIAGRA) 50 MG tablet PRN Active   therapeutic multivitamin-minerals (THERAGRAN-M) TABS Taking Active                    SOCIAL HISTORY  Social History     Tobacco Use    Smoking status: Never    Smokeless tobacco: Current     Types: Snuff, Chew     Last attempt to quit: 1/1/2017   Vaping Use    Vaping Use: Never used   Substance and Sexual Activity    Alcohol use: Yes     Alcohol/week: 9.0 oz     Types: 15 Standard drinks or equivalent per week    Drug use: No    Sexual activity: Yes     Partners: Female       FAMILY HISTORY  Family History   Problem Relation Age of Onset    Psychiatric Illness Mother     Diabetes Father           Objective:     VITAL SIGNS: /68   Pulse 77   Temp 36.5 °C (97.7 °F) (Temporal)   Resp 14   Ht 1.829 m (6')   Wt 115 kg (253 lb)   SpO2 97%   BMI 34.31 kg/m²     PHYSICAL EXAM  Physical Exam  Constitutional:       Appearance: Normal appearance.   HENT:      Head: Normocephalic and atraumatic.      Comments: Tenderness over the left maxillary sinus     Nose: Nose normal. No congestion or rhinorrhea.      Mouth/Throat:      Comments: Multiple dental caries present.  There is slight gingival swelling laying to left maxillary gingival above the canine and extending back towards the molars.  No palpable abscess.  Eyes:      Conjunctiva/sclera: Conjunctivae normal.   Pulmonary:      Effort: Pulmonary effort is normal.   Musculoskeletal:      Cervical back: Normal range of motion.   Neurological:      General: No focal deficit present.      Mental Status: He is alert and oriented to person, place, and time. Mental status is at baseline.         Assessment/Plan:     1. Dental infection  - amoxicillin-clavulanate (AUGMENTIN) 875-125 MG Tab; Take 1 Tablet by mouth 2 times a day for 7 days.  Dispense: 14 Tablet; Refill:  0      MDM/Comments:    Will start the patient on a course of Augmentin for ascending dental infection.  No visible gingival abscess present.  Alternate Tylenol and ibuprofen as needed.  Take antibiotic with food.  Close follow-up with dentistry for definitive management.    Differential diagnosis, natural history, supportive care, and indications for immediate follow-up discussed. All questions answered. Patient agrees with the plan of care.    Follow-up as needed if symptoms worsen or fail to improve to PCP, Urgent care or Emergency Room.    I have personally reviewed prior external notes and test results pertinent to today's visit.  I have independently reviewed and interpreted all diagnostics ordered during this urgent care acute visit.   Discussed management options (risks,benefits, and alternatives to treatment). Pt expresses understanding and the treatment plan was agreed upon. Questions were encouraged and answered to pt's satisfaction.    Please note that this dictation was created using voice recognition software. I have made a reasonable attempt to correct obvious errors, but I expect that there are errors of grammar and possibly content that I did not discover before finalizing the note.

## 2023-12-24 ENCOUNTER — OFFICE VISIT (OUTPATIENT)
Dept: URGENT CARE | Facility: PHYSICIAN GROUP | Age: 63
End: 2023-12-24
Payer: OTHER GOVERNMENT

## 2023-12-24 VITALS
TEMPERATURE: 97.8 F | DIASTOLIC BLOOD PRESSURE: 86 MMHG | BODY MASS INDEX: 32.78 KG/M2 | HEART RATE: 85 BPM | OXYGEN SATURATION: 96 % | WEIGHT: 242 LBS | SYSTOLIC BLOOD PRESSURE: 130 MMHG | RESPIRATION RATE: 16 BRPM | HEIGHT: 72 IN

## 2023-12-24 DIAGNOSIS — K04.7 DENTAL INFECTION: ICD-10-CM

## 2023-12-24 PROCEDURE — 99213 OFFICE O/P EST LOW 20 MIN: CPT | Performed by: FAMILY MEDICINE

## 2023-12-24 PROCEDURE — 3079F DIAST BP 80-89 MM HG: CPT | Performed by: FAMILY MEDICINE

## 2023-12-24 PROCEDURE — 1125F AMNT PAIN NOTED PAIN PRSNT: CPT | Performed by: FAMILY MEDICINE

## 2023-12-24 PROCEDURE — 3075F SYST BP GE 130 - 139MM HG: CPT | Performed by: FAMILY MEDICINE

## 2023-12-24 RX ORDER — HYDROCODONE BITARTRATE AND ACETAMINOPHEN 5; 325 MG/1; MG/1
TABLET ORAL
Qty: 15 TABLET | Refills: 0 | Status: SHIPPED | OUTPATIENT
Start: 2023-12-24 | End: 2023-12-28

## 2023-12-24 ASSESSMENT — FIBROSIS 4 INDEX: FIB4 SCORE: 1.32

## 2023-12-24 ASSESSMENT — PAIN SCALES - GENERAL: PAINLEVEL: 3=SLIGHT PAIN

## 2023-12-24 NOTE — PROGRESS NOTES
Chief Complaint:    Chief Complaint   Patient presents with    Dental Pain     Pain is getting worse, is still on antibiotics       History of Present Illness:    Wife present. Saw other provider on 12/22/23, was rx'd Augmentin 875 mg BID x 7 days for left upper dental infection, visit reviewed. He reports the problem is improving, but the waves of pain can be unbearable and would like something for pain control. He is in process of getting into see Dentist.      Past Medical History:    Past Medical History:   Diagnosis Date    Backpain     2000 started    Dyslipidemia 11/8/2011    ED (erectile dysfunction) 8/25/2010    Hypothyroidism 8/25/2010    Vitamin d deficiency 5/2/2012     Past Surgical History:    Past Surgical History:   Procedure Laterality Date    LUMBAR LAMINECTOMY DISKECTOMY  3/18/2011    Performed by RAJ VALADEZ at SURGERY [facility]    LUMBAR DECOMPRESSION  3/18/2011    Performed by RAJ VALADEZ at SURGERY [facility]    LAMINOTOMY      OTHER ORTHOPEDIC SURGERY      back surgery 2002 L4     Social History:    Social History     Socioeconomic History    Marital status:      Spouse name: Not on file    Number of children: Not on file    Years of education: Not on file    Highest education level: Not on file   Occupational History    Not on file   Tobacco Use    Smoking status: Never    Smokeless tobacco: Current     Types: Snuff, Chew     Last attempt to quit: 1/1/2017   Vaping Use    Vaping Use: Never used   Substance and Sexual Activity    Alcohol use: Yes     Alcohol/week: 9.0 oz     Types: 15 Standard drinks or equivalent per week    Drug use: No    Sexual activity: Yes     Partners: Female   Other Topics Concern    Not on file   Social History Narrative    Not on file     Social Determinants of Health     Financial Resource Strain: Not on file   Food Insecurity: Not on file   Transportation Needs: Not on file   Physical Activity: Not on file   Stress: Not on file   Social  Connections: Not on file   Intimate Partner Violence: Not on file   Housing Stability: Not on file     Family History:    Family History   Problem Relation Age of Onset    Psychiatric Illness Mother     Diabetes Father      Medications:    Current Outpatient Medications on File Prior to Visit   Medication Sig Dispense Refill    amoxicillin-clavulanate (AUGMENTIN) 875-125 MG Tab Take 1 Tablet by mouth 2 times a day for 7 days. 14 Tablet 0    albuterol 108 (90 Base) MCG/ACT Aero Soln inhalation aerosol Inhale 1-2 Puffs every four hours as needed for Shortness of Breath. 1 Each 11    cyclobenzaprine (FLEXERIL) 10 mg Tab Take 0.5-1 Tablets by mouth 3 times a day as needed for Moderate Pain. 30 Tablet 0    sildenafil citrate (VIAGRA) 50 MG tablet Take 1 Tablet by mouth as needed for Erectile Dysfunction. 30 Tablet 6    therapeutic multivitamin-minerals (THERAGRAN-M) TABS Take 1 Tab by mouth every day.         No current facility-administered medications on file prior to visit.     Allergies:    No Known Allergies      Vitals:    Vitals:    23 0931   BP: 130/86   Pulse: 85   Resp: 16   Temp: 36.6 °C (97.8 °F)   TempSrc: Temporal   SpO2: 96%   Weight: 110 kg (242 lb)   Height: 1.829 m (6')       Physical Exam:    Constitutional: Vital signs reviewed. Appears well-developed and well-nourished. No acute distress.   Eyes: Sclera white, conjunctivae clear.   ENT: External ears normal. Hearing normal.   Neck: Neck supple.   Pulmonary/Chest: Respirations non-labored.   Musculoskeletal: Normal gait. No muscular atrophy or weakness.  Neurological: Alert and oriented to person, place, and time. Muscle tone normal. Coordination normal.   Skin: No rashes or lesions. Warm, dry, normal turgor.  Psychiatric: Normal mood and affect. Behavior is normal. Judgment and thought content normal.       Assessment / Plan & Medical Decision Makin. Dental infection  - HYDROcodone-acetaminophen (NORCO) 5-325 MG Tab per tablet; 1 TAB BY  MOUTH EVERY 6 HOURS ONLY IF NEEDED FOR PAIN FOR UP TO 4 DAYS. DO NOT WORK FOR 12 HOURS AFTER LAST DOSE. MAY CAUSE DROWSINESS.  Dispense: 15 Tablet; Refill: 0       Discussed with them DDX, management options, and risks, benefits, and alternatives to treatment plan agreed upon.    Wife present. Saw other provider on 12/22/23, was rx'd Augmentin 875 mg BID x 7 days for left upper dental infection, visit reviewed. He reports the problem is improving, but the waves of pain can be unbearable and would like something for pain control. He is in process of getting into see Dentist.    He will continue with the Augmentin prescribed on 12/22/23.    Agreeable to medication prescribed. This is on a list of OK to take in regards to his work as long as he does not work for 12 hours after last dose.  report checked - no Rx x 2 years.    In my professional judgment, after considering each of the factors set forth in , the CS is medically necessary and appropriate.    Advised to see Dentist for definitive treatment.    Will return to urgent care if needed.

## 2023-12-28 DIAGNOSIS — K04.7 DENTAL INFECTION: ICD-10-CM

## 2023-12-28 RX ORDER — AMOXICILLIN AND CLAVULANATE POTASSIUM 875; 125 MG/1; MG/1
1 TABLET, FILM COATED ORAL 2 TIMES DAILY
Qty: 10 TABLET | Refills: 0 | Status: SHIPPED | OUTPATIENT
Start: 2023-12-28 | End: 2024-01-02

## 2024-03-26 ENCOUNTER — OFFICE VISIT (OUTPATIENT)
Dept: URGENT CARE | Facility: PHYSICIAN GROUP | Age: 64
End: 2024-03-26
Payer: OTHER GOVERNMENT

## 2024-03-26 ENCOUNTER — APPOINTMENT (OUTPATIENT)
Dept: RADIOLOGY | Facility: MEDICAL CENTER | Age: 64
End: 2024-03-26
Attending: EMERGENCY MEDICINE
Payer: OTHER GOVERNMENT

## 2024-03-26 ENCOUNTER — HOSPITAL ENCOUNTER (EMERGENCY)
Facility: MEDICAL CENTER | Age: 64
End: 2024-03-26
Attending: EMERGENCY MEDICINE
Payer: OTHER GOVERNMENT

## 2024-03-26 VITALS
OXYGEN SATURATION: 96 % | DIASTOLIC BLOOD PRESSURE: 79 MMHG | SYSTOLIC BLOOD PRESSURE: 120 MMHG | RESPIRATION RATE: 16 BRPM | HEART RATE: 72 BPM | BODY MASS INDEX: 32.55 KG/M2 | WEIGHT: 240.3 LBS | HEIGHT: 72 IN | TEMPERATURE: 97.1 F

## 2024-03-26 VITALS
HEIGHT: 72 IN | BODY MASS INDEX: 32.37 KG/M2 | HEART RATE: 74 BPM | SYSTOLIC BLOOD PRESSURE: 110 MMHG | RESPIRATION RATE: 14 BRPM | DIASTOLIC BLOOD PRESSURE: 62 MMHG | TEMPERATURE: 97.3 F | OXYGEN SATURATION: 98 % | WEIGHT: 239 LBS

## 2024-03-26 DIAGNOSIS — R07.9 CHEST PAIN, UNSPECIFIED TYPE: Primary | ICD-10-CM

## 2024-03-26 DIAGNOSIS — R07.9 ACUTE CHEST PAIN: ICD-10-CM

## 2024-03-26 LAB
ALBUMIN SERPL BCP-MCNC: 4.3 G/DL (ref 3.2–4.9)
ALBUMIN/GLOB SERPL: 1.6 G/DL
ALP SERPL-CCNC: 66 U/L (ref 30–99)
ALT SERPL-CCNC: 17 U/L (ref 2–50)
ANION GAP SERPL CALC-SCNC: 12 MMOL/L (ref 7–16)
AST SERPL-CCNC: 17 U/L (ref 12–45)
BASOPHILS # BLD AUTO: 0.3 % (ref 0–1.8)
BASOPHILS # BLD: 0.03 K/UL (ref 0–0.12)
BILIRUB SERPL-MCNC: 0.8 MG/DL (ref 0.1–1.5)
BUN SERPL-MCNC: 17 MG/DL (ref 8–22)
CALCIUM ALBUM COR SERPL-MCNC: 9.4 MG/DL (ref 8.5–10.5)
CALCIUM SERPL-MCNC: 9.6 MG/DL (ref 8.5–10.5)
CHLORIDE SERPL-SCNC: 106 MMOL/L (ref 96–112)
CO2 SERPL-SCNC: 22 MMOL/L (ref 20–33)
CREAT SERPL-MCNC: 0.9 MG/DL (ref 0.5–1.4)
EKG IMPRESSION: NORMAL
EOSINOPHIL # BLD AUTO: 0.04 K/UL (ref 0–0.51)
EOSINOPHIL NFR BLD: 0.4 % (ref 0–6.9)
ERYTHROCYTE [DISTWIDTH] IN BLOOD BY AUTOMATED COUNT: 45.3 FL (ref 35.9–50)
GFR SERPLBLD CREATININE-BSD FMLA CKD-EPI: 97 ML/MIN/1.73 M 2
GLOBULIN SER CALC-MCNC: 2.7 G/DL (ref 1.9–3.5)
GLUCOSE SERPL-MCNC: 91 MG/DL (ref 65–99)
HCT VFR BLD AUTO: 44.6 % (ref 42–52)
HGB BLD-MCNC: 15.2 G/DL (ref 14–18)
IMM GRANULOCYTES # BLD AUTO: 0.02 K/UL (ref 0–0.11)
IMM GRANULOCYTES NFR BLD AUTO: 0.2 % (ref 0–0.9)
LYMPHOCYTES # BLD AUTO: 1.67 K/UL (ref 1–4.8)
LYMPHOCYTES NFR BLD: 17.8 % (ref 22–41)
MCH RBC QN AUTO: 32.8 PG (ref 27–33)
MCHC RBC AUTO-ENTMCNC: 34.1 G/DL (ref 32.3–36.5)
MCV RBC AUTO: 96.1 FL (ref 81.4–97.8)
MONOCYTES # BLD AUTO: 0.62 K/UL (ref 0–0.85)
MONOCYTES NFR BLD AUTO: 6.6 % (ref 0–13.4)
NEUTROPHILS # BLD AUTO: 6.99 K/UL (ref 1.82–7.42)
NEUTROPHILS NFR BLD: 74.7 % (ref 44–72)
NRBC # BLD AUTO: 0 K/UL
NRBC BLD-RTO: 0 /100 WBC (ref 0–0.2)
PLATELET # BLD AUTO: 223 K/UL (ref 164–446)
PMV BLD AUTO: 9.5 FL (ref 9–12.9)
POTASSIUM SERPL-SCNC: 4.4 MMOL/L (ref 3.6–5.5)
PROT SERPL-MCNC: 7 G/DL (ref 6–8.2)
RBC # BLD AUTO: 4.64 M/UL (ref 4.7–6.1)
SODIUM SERPL-SCNC: 140 MMOL/L (ref 135–145)
TROPONIN T SERPL-MCNC: 7 NG/L (ref 6–19)
WBC # BLD AUTO: 9.4 K/UL (ref 4.8–10.8)

## 2024-03-26 PROCEDURE — 71045 X-RAY EXAM CHEST 1 VIEW: CPT

## 2024-03-26 PROCEDURE — 85025 COMPLETE CBC W/AUTO DIFF WBC: CPT

## 2024-03-26 PROCEDURE — 99283 EMERGENCY DEPT VISIT LOW MDM: CPT

## 2024-03-26 PROCEDURE — 93005 ELECTROCARDIOGRAM TRACING: CPT | Performed by: EMERGENCY MEDICINE

## 2024-03-26 PROCEDURE — 84484 ASSAY OF TROPONIN QUANT: CPT

## 2024-03-26 PROCEDURE — 80053 COMPREHEN METABOLIC PANEL: CPT

## 2024-03-26 PROCEDURE — 93005 ELECTROCARDIOGRAM TRACING: CPT

## 2024-03-26 RX ORDER — ASPIRIN 81 MG/1
324 TABLET, CHEWABLE ORAL ONCE
Status: COMPLETED | OUTPATIENT
Start: 2024-03-26 | End: 2024-03-26

## 2024-03-26 RX ADMIN — ASPIRIN 324 MG: 81 TABLET, CHEWABLE ORAL at 09:51

## 2024-03-26 ASSESSMENT — FIBROSIS 4 INDEX
FIB4 SCORE: 1.32
FIB4 SCORE: 1.32

## 2024-03-26 ASSESSMENT — HEART SCORE
RISK FACTORS: 1-2 RISK FACTORS
AGE: 45-64
ECG: NORMAL
HISTORY: SLIGHTLY SUSPICIOUS
HEART SCORE: 2
TROPONIN: LESS THAN OR EQUAL TO NORMAL LIMIT

## 2024-03-26 ASSESSMENT — ENCOUNTER SYMPTOMS: SHORTNESS OF BREATH: 1

## 2024-03-26 NOTE — PROGRESS NOTES
Subjective:     Devon Newton is a 63 y.o. male who presents for Chest Pain (Tightness in chest. Started yesterday. Thought it was a pulled muscle. Got worse through the night. Slight tingling in face 2 am yesterday. )      Chest Pain   Associated symptoms include shortness of breath.     Pt presents for evaluation of a new problem.  Devon is a very pleasant 63-year-old male presents to urgent care today with complaints of chest pain that started yesterday afternoon.  His chest pain did worsen throughout the night and did awaken him from sleep at approximately 2 AM.  At this time he states that his chest pain was rated as a 9/10.  He did take some ibuprofen for relief of his symptoms.  His pain does radiate to his back.  He denies any headache, jaw pain or nausea.  He does endorse tingling of his face.  No history of cardiac disease, obesity or diabetes.  He does suffer from dyslipidemia.  He initially believed that his chest pain was due to a pulled muscle however, his pain is present at rest, worsens with deep breaths and is not reproducible.    Review of Systems   Respiratory:  Positive for shortness of breath.    Cardiovascular:  Positive for chest pain.       PMH:   Past Medical History:   Diagnosis Date    Backpain     2000 started    Dyslipidemia 11/8/2011    ED (erectile dysfunction) 8/25/2010    Hypothyroidism 8/25/2010    Vitamin d deficiency 5/2/2012     ALLERGIES: No Known Allergies  SURGHX:   Past Surgical History:   Procedure Laterality Date    LUMBAR LAMINECTOMY DISKECTOMY  3/18/2011    Performed by RAJ VALADEZ at SURGERY [facility] ORS    LUMBAR DECOMPRESSION  3/18/2011    Performed by RAJ VALADEZ at SURGERY [facility] ORS    LAMINOTOMY      OTHER ORTHOPEDIC SURGERY      back surgery 2002 L4     SOCHX:   Social History     Socioeconomic History    Marital status:    Tobacco Use    Smoking status: Never    Smokeless tobacco: Current     Types: Snuff, Chew     Last attempt to quit:  1/1/2017   Vaping Use    Vaping Use: Never used   Substance and Sexual Activity    Alcohol use: Yes     Alcohol/week: 9.0 oz     Types: 15 Standard drinks or equivalent per week    Drug use: No    Sexual activity: Yes     Partners: Female     FH:   Family History   Problem Relation Age of Onset    Psychiatric Illness Mother     Diabetes Father          Objective:   /62   Pulse 74   Temp 36.3 °C (97.3 °F) (Temporal)   Resp 14   Ht 1.829 m (6')   Wt 108 kg (239 lb)   SpO2 98%   BMI 32.41 kg/m²     Physical Exam  Vitals and nursing note reviewed.   Constitutional:       General: He is not in acute distress.     Appearance: Normal appearance. He is normal weight. He is not ill-appearing or toxic-appearing.   HENT:      Head: Normocephalic.      Right Ear: External ear normal.      Left Ear: External ear normal.      Nose: Nose normal.      Mouth/Throat:      Mouth: Mucous membranes are moist.   Eyes:      General:         Right eye: No discharge.         Left eye: No discharge.      Extraocular Movements: Extraocular movements intact.      Conjunctiva/sclera: Conjunctivae normal.      Pupils: Pupils are equal, round, and reactive to light.   Cardiovascular:      Rate and Rhythm: Normal rate and regular rhythm.      Pulses: Normal pulses.      Heart sounds: Normal heart sounds.   Pulmonary:      Effort: Pulmonary effort is normal.      Breath sounds: Normal breath sounds.   Abdominal:      General: Abdomen is flat.   Musculoskeletal:         General: Normal range of motion.      Cervical back: Normal range of motion and neck supple. No rigidity.   Lymphadenopathy:      Cervical: No cervical adenopathy.   Skin:     General: Skin is warm and dry.   Neurological:      General: No focal deficit present.      Mental Status: He is alert and oriented to person, place, and time. Mental status is at baseline.   Psychiatric:         Mood and Affect: Mood normal.         Behavior: Behavior normal.         Judgment:  Judgment normal.       ECG: Rate of 66 normal sinus rhythm with right bundle branch block  Assessment/Plan:   Assessment      1. Chest pain, unspecified type  EKG - Clinic Performed    aspirin (Asa) chewable tab 324 mg        Due to patient's chief complaint of chest pain he was referred to follow-up in emergency room for higher level of care.  Patient was educated that I am unable to diagnose or rule out cardiac event in urgent care.  Patient is accompanied by wife who will transport him to [facility].  Transfer center notified of patient's impending arrival.

## 2024-03-26 NOTE — ED NOTES
Pt discharged to lobby with steady gait. GCS 15. Pt in possession of belongings. Pt provided discharge education and information pertaining to medications and follow up appointments. Pt received copy of discharge instructions and verbalized understanding.     Vitals:    03/26/24 1222   BP: 120/79   Pulse: 72   Resp: 16   Temp: 36.2 °C (97.1 °F)   SpO2: 96%

## 2024-03-26 NOTE — ED PROVIDER NOTES
ED Provider Note    CHIEF COMPLAINT  Chief Complaint   Patient presents with    Chest Pain     Left side chest pain since  yesterday morning. Worst with deep breath.     Sent from Urgent Care     Patient sent from urgent care for work up of chest pain.        EXTERNAL RECORDS REVIEWED  Urgent care note from prior to arrival, sent to ER for chest pain workup    HPI/ROS  LIMITATION TO HISTORY   Select: : None  OUTSIDE HISTORIAN(S):  Wife at the bedside    Devon Newton is a 63 y.o. male who presents for evaluation of left-sided chest pain, sent from urgent care.  He began experiencing this yesterday morning, well over 24 hours ago, a constant left-sided pressure in his chest that seems worse with deep inspiration, he initially thought this was related to a pulled muscle but states that movement does not really exacerbate it.  Exertion does not exacerbate and he is not short of breath.  He has a history of recently diagnosed mild dyslipidemia not on medication.  No known history of hypertension or diabetes, he is never a cigarette smoker, he does have a family history of cardiac disease, his father had bypass in his 70s.  No abdominal pain.  No vomiting.  His wife reports that he seems to be under tremendous amount of stress.  No other acute complaints.    PAST MEDICAL HISTORY   has a past medical history of Backpain, Dyslipidemia (11/8/2011), ED (erectile dysfunction) (8/25/2010), Hypothyroidism (8/25/2010), and Vitamin d deficiency (5/2/2012).    SURGICAL HISTORY   has a past surgical history that includes laminotomy; other orthopedic surgery; lumbar laminectomy diskectomy (3/18/2011); and lumbar decompression (3/18/2011).    FAMILY HISTORY  Family History   Problem Relation Age of Onset    Psychiatric Illness Mother     Diabetes Father        SOCIAL HISTORY  Social History     Tobacco Use    Smoking status: Never    Smokeless tobacco: Current     Types: Snuff, Chew     Last attempt to quit: 1/1/2017   Vaping  Use    Vaping Use: Never used   Substance and Sexual Activity    Alcohol use: Yes     Alcohol/week: 9.0 oz     Types: 15 Standard drinks or equivalent per week    Drug use: No    Sexual activity: Yes     Partners: Female       CURRENT MEDICATIONS  Home Medications       Reviewed by Franci Gerard R.N. (Registered Nurse) on 03/26/24 at 1047  Med List Status: Partial     Medication Last Dose Status   albuterol 108 (90 Base) MCG/ACT Aero Soln inhalation aerosol  Active   cyclobenzaprine (FLEXERIL) 10 mg Tab  Active   sildenafil citrate (VIAGRA) 50 MG tablet  Active   therapeutic multivitamin-minerals (THERAGRAN-M) TABS  Active                    ALLERGIES  No Known Allergies    PHYSICAL EXAM  VITAL SIGNS: /79   Pulse 72   Temp 36.1 °C (97 °F) (Temporal)   Resp 18   Ht 1.829 m (6')   Wt 109 kg (240 lb 4.8 oz)   SpO2 96%   BMI 32.59 kg/m²    Constitutional: Well appearing patient in no acute distress.  Awake and alert, not toxic nor ill in appearance.  HENT: Normocephalic, no obvious evidence of acute trauma.  Eyes: No scleral icterus. Normal conjunctiva   Thorax & Lungs: Normal nonlabored respirations. I appreciate no wheezing, rhonchi or rales. There is normal air movement.  Upon cardiac ascultation I appreciate a regular heart rhythm and a normal rate.   Abdomen: The abdomen is not visibly distended. Upon palpation, I find it to be without tenderness.  No mass appreciated.  Skin: The exposed portions of skin reveal no obvious rash or other abnormalities.  Extremities/Musculoskeletal: No obvious sign of acute trauma. No asymmetric calf tenderness or edema.   Neurologic: Alert & oriented. No focal deficits observed.      DIAGNOSTIC STUDIES / PROCEDURES    LABS  Results for orders placed or performed during the hospital encounter of 03/26/24   CBC with Differential   Result Value Ref Range    WBC 9.4 4.8 - 10.8 K/uL    RBC 4.64 (L) 4.70 - 6.10 M/uL    Hemoglobin 15.2 14.0 - 18.0 g/dL    Hematocrit 44.6 42.0  - 52.0 %    MCV 96.1 81.4 - 97.8 fL    MCH 32.8 27.0 - 33.0 pg    MCHC 34.1 32.3 - 36.5 g/dL    RDW 45.3 35.9 - 50.0 fL    Platelet Count 223 164 - 446 K/uL    MPV 9.5 9.0 - 12.9 fL    Neutrophils-Polys 74.70 (H) 44.00 - 72.00 %    Lymphocytes 17.80 (L) 22.00 - 41.00 %    Monocytes 6.60 0.00 - 13.40 %    Eosinophils 0.40 0.00 - 6.90 %    Basophils 0.30 0.00 - 1.80 %    Immature Granulocytes 0.20 0.00 - 0.90 %    Nucleated RBC 0.00 0.00 - 0.20 /100 WBC    Neutrophils (Absolute) 6.99 1.82 - 7.42 K/uL    Lymphs (Absolute) 1.67 1.00 - 4.80 K/uL    Monos (Absolute) 0.62 0.00 - 0.85 K/uL    Eos (Absolute) 0.04 0.00 - 0.51 K/uL    Baso (Absolute) 0.03 0.00 - 0.12 K/uL    Immature Granulocytes (abs) 0.02 0.00 - 0.11 K/uL    NRBC (Absolute) 0.00 K/uL   Complete Metabolic Panel (CMP)   Result Value Ref Range    Sodium 140 135 - 145 mmol/L    Potassium 4.4 3.6 - 5.5 mmol/L    Chloride 106 96 - 112 mmol/L    Co2 22 20 - 33 mmol/L    Anion Gap 12.0 7.0 - 16.0    Glucose 91 65 - 99 mg/dL    Bun 17 8 - 22 mg/dL    Creatinine 0.90 0.50 - 1.40 mg/dL    Calcium 9.6 8.5 - 10.5 mg/dL    Correct Calcium 9.4 8.5 - 10.5 mg/dL    AST(SGOT) 17 12 - 45 U/L    ALT(SGPT) 17 2 - 50 U/L    Alkaline Phosphatase 66 30 - 99 U/L    Total Bilirubin 0.8 0.1 - 1.5 mg/dL    Albumin 4.3 3.2 - 4.9 g/dL    Total Protein 7.0 6.0 - 8.2 g/dL    Globulin 2.7 1.9 - 3.5 g/dL    A-G Ratio 1.6 g/dL   Troponin STAT   Result Value Ref Range    Troponin T 7 6 - 19 ng/L   ESTIMATED GFR   Result Value Ref Range    GFR (CKD-EPI) 97 >60 mL/min/1.73 m 2   EKG   Result Value Ref Range    Report       [facility] Emergency Dept.    Test Date:  2024  Pt Name:    ELISSA MCDANIELS                Department: ER  MRN:        [id number]                      Room:       [facility]  Gender:     Male                         Technician: [id number]  :        1960                   Requested By:ER TRIAGE PROTOCOL  Order #:    [id number]                    Shaye MD:  LETICIA TO MD    Measurements  Intervals                                Axis  Rate:       63                           P:          47  UT:         160                          QRS:        40  QRSD:       154                          T:          11  QT:         438  QTc:        449    Interpretive Statements  Sinus rhythm rate of 63, normal UT interval, right bundle branch block,  upright T waves, no ST segment deviation.  My impression of this EKG: No  acute ischemia, no change from 3/17/2011  Electronically Signed On 03- 12:21:13 PDT by LETICIA TO MD          RADIOLOGY  I have independently interpreted the diagnostic imaging associated with this visit and am waiting the final reading from the radiologist.   My preliminary interpretation is as follows: No infiltrate or pneumothorax  Radiologist interpretation:   DX-CHEST-PORTABLE (1 VIEW)   Final Result      No acute cardiac or pulmonary abnormalities are identified.            COURSE & MEDICAL DECISION MAKING    ED Observation Status? No; Patient does not meet criteria for ED Observation.     INITIAL ASSESSMENT, COURSE AND PLAN  Care Narrative: 63-year-old male coming in with chest pain constantly present since yesterday morning, well over 24 hours, a pressure-like sensation that is not worsened with exertion and not associated with shortness of breath.  He has minimal risk factors for cardiac disease.  Initial EKG is nonischemic.  His examination and vital signs are unremarkable.  His blood work is reassuring, normal troponin, no anemia, no significant electrolyte abnormalities, renal or hepatic dysfunction.  Low heart score.  At this point, he is stable and appropriate to be discharged.  I have encouraged him to follow-up with his PCP for discussion regarding possibility of cardiac stress testing.  Return instructions have been discussed with the patient and his wife at the bedside and he is discharged home in stable condition    DISPOSITION  AND DISCUSSIONS    Decision tools and prescription drugs considered including, but not limited to: HEART Score: 2 .    FINAL DIAGNOSIS  1. Acute chest pain           Electronically signed by: Guy Louie M.D., 3/26/2024 12:21 PM

## 2024-05-31 ENCOUNTER — DOCUMENTATION (OUTPATIENT)
Dept: HEALTH INFORMATION MANAGEMENT | Facility: OTHER | Age: 64
End: 2024-05-31
Payer: OTHER GOVERNMENT